# Patient Record
Sex: FEMALE | Race: WHITE | NOT HISPANIC OR LATINO | Employment: PART TIME | URBAN - METROPOLITAN AREA
[De-identification: names, ages, dates, MRNs, and addresses within clinical notes are randomized per-mention and may not be internally consistent; named-entity substitution may affect disease eponyms.]

---

## 2018-08-09 DIAGNOSIS — Q27.0 TWO VESSEL UMBILICAL CORD: Primary | ICD-10-CM

## 2018-08-09 DIAGNOSIS — Z36.89 ENCOUNTER FOR FETAL ANATOMIC SURVEY: ICD-10-CM

## 2018-08-14 ENCOUNTER — OFFICE VISIT (OUTPATIENT)
Dept: MATERNAL FETAL MEDICINE | Facility: CLINIC | Age: 23
End: 2018-08-14
Payer: MEDICAID

## 2018-08-14 ENCOUNTER — INITIAL CONSULT (OUTPATIENT)
Dept: MATERNAL FETAL MEDICINE | Facility: CLINIC | Age: 23
End: 2018-08-14
Payer: MEDICAID

## 2018-08-14 VITALS
SYSTOLIC BLOOD PRESSURE: 122 MMHG | WEIGHT: 173.94 LBS | BODY MASS INDEX: 30.81 KG/M2 | DIASTOLIC BLOOD PRESSURE: 84 MMHG

## 2018-08-14 DIAGNOSIS — O09.899 SINGLE UMBILICAL ARTERY, MATERNAL, ANTEPARTUM: ICD-10-CM

## 2018-08-14 DIAGNOSIS — Q27.0 TWO VESSEL UMBILICAL CORD: ICD-10-CM

## 2018-08-14 DIAGNOSIS — Z36.89 ENCOUNTER FOR ULTRASOUND TO CHECK FETAL GROWTH: Primary | ICD-10-CM

## 2018-08-14 DIAGNOSIS — Z36.89 ENCOUNTER FOR FETAL ANATOMIC SURVEY: ICD-10-CM

## 2018-08-14 PROCEDURE — 76811 OB US DETAILED SNGL FETUS: CPT | Mod: PBBFAC | Performed by: PEDIATRICS

## 2018-08-14 PROCEDURE — 99212 OFFICE O/P EST SF 10 MIN: CPT | Mod: PBBFAC,TH | Performed by: PEDIATRICS

## 2018-08-14 PROCEDURE — 99214 OFFICE O/P EST MOD 30 MIN: CPT | Mod: S$PBB,TH,25, | Performed by: PEDIATRICS

## 2018-08-14 PROCEDURE — 99211 OFF/OP EST MAY X REQ PHY/QHP: CPT | Mod: PBBFAC,25,27

## 2018-08-14 PROCEDURE — 99999 PR PBB SHADOW E&M-EST. PATIENT-LVL I: CPT | Mod: PBBFAC,,,

## 2018-08-14 PROCEDURE — 76811 OB US DETAILED SNGL FETUS: CPT | Mod: 26,S$PBB,, | Performed by: PEDIATRICS

## 2018-08-14 PROCEDURE — 99999 PR PBB SHADOW E&M-EST. PATIENT-LVL II: CPT | Mod: PBBFAC,,, | Performed by: PEDIATRICS

## 2018-08-14 NOTE — LETTER
August 15, 2018      Iris Leyva MD  [address] [facility] [address]           [facility] Maternal [facility]etal [facility]  [address] [facility] [address]  Phone: [phone]          Patient: Shereen Walker   MR Number: [id number]   YOB: 1995   Date of Visit: 8/14/2018       Dear Dr. Iris Leyva:    Thank you for referring Shereen Walker to me for evaluation. Attached you will find relevant portions of my assessment and plan of care.    If you have questions, please do not hesitate to call me. I look forward to following Shereen Walker along with you.    Sincerely,    Yuliya Romo MD    Enclosure  CC:  No Recipients    If you would like to receive this communication electronically, please contact [email or web address] or [phone] to request more information on [employer] Link access.    For providers and/or their staff who would like to refer a patient to [facility], please contact us through our one-stop-shop provider referral line, [facility], at [phone].    If you feel you have received this communication in error or would no longer like to receive these types of communications, please e-mail [email or web address]

## 2018-08-15 PROBLEM — O09.899 SINGLE UMBILICAL ARTERY, MATERNAL, ANTEPARTUM: Status: ACTIVE | Noted: 2018-08-15

## 2018-08-15 NOTE — PROGRESS NOTES
"Indication  ========    Consult: Fetal Anatomical Survey/2VC.    History  ======    General History  Blood group: B  Rhesus: Rh positive  Height 160 cm  Height (ft) 5 ft  Height (in) 3 in  Previous Outcomes   1  Para 0    Pregnancy History  ==============    Maternal Lab Tests  Genetic screening declined.      Maternal Assessment  =================    Weight 79 kg  Weight (lb) 174 lb  Height 160 cm  Height (ft) 5 ft  Height (in) 3 in  BP syst 122 mmHg  BP diast 84 mmHg  BMI 30.85 kg/m²    Method  ======    Transabdominal ultrasound examination. View: Suboptimal view: limited by late gestational age. Suboptimal view: limited by fetal position.    Pregnancy  =========    Sykes pregnancy. Number of fetuses: 1.    Dating  ======    GA by "stated dating" 28 w + 4 d  JOSSELIN by "stated dating": 2018  Ultrasound examination on: 2018  GA by U/S based upon: AC, BPD, Femur, HC  GA by U/S 28 w + 4 d  JOSSELIN by U/S: 2018  Assigned: Dating performed on 2018, based on the external assessment  Assigned GA 28 w + 4 d  Assigned JOSSELIN: 2018    General Evaluation  ==============    Cardiac activity: present.  bpm.  Fetal movements: visualized.  Presentation: cephalic.  Placenta:  Placental site: posterior, fundal.  Umbilical cord: Cord vessels: 2 vessel cord; single left umbilical artery. Cord insertion: placental insertion: normal.  Amniotic fluid: Amount of AF: normal amount. MVP 4.7 cm.    Fetal Biometry  ============    Fetal Biometry  BPD 73.3 mm 29w 3d Hadlock  OFD 91.8 mm 29w 4d Margi  .3 mm 28w 5d Hadlock  .7 mm 28w 0d Hadlock  Femur 53.0 mm 28w 1d Hadlock  EFW 1,192 g 37% Brad  Calculated by: Hadlock (BPD-HC-AC-FL)  EFW (lb) 2 lb  EFW (oz) 10 oz  Cephalic index 0.80  HC / AC 1.12  FL / BPD 0.72  FL / AC 0.22  MVP 4.7 cm   bpm    Fetal Anatomy  ============    Cranium: normal  Lateral ventricles: normal  Choroid plexus: normal  Midline falx: suboptimal  Cavum septi " pellucidi: suboptimal  Cerebellum: suboptimal  Cisterna magna: suboptimal  Neck: suboptimal  Lips: normal  Profile: normal  Nose: normal  4-chamber view: normal  RVOT: normal  LVOT: normal  Heart / Thorax: septum visualized  Situs: normal  Aortic arch: suboptimal  Ductal arch: suboptimal  SVC: suboptimal  IVC: suboptimal  3-vessel view: normal  3-vessel-trachea view: normal  Rt lung: normal  Lt lung: normal  Diaphragm: normal  Cord insertion: normal  Stomach: normal  Bladder: normal  Genitals: normal  Rt kidney: suboptimal  Lt kidney: normal  Liver: normal  Bowel: normal  Rt renal artery: suboptimal  Lt renal artery: normal  Cervical spine: normal  Thoracic spine: normal  Lumbar spine: normal  Sacral spine: normal  Spine / Skelet.: sub opt transverse spine  Rt arm: normal  Lt arm: normal  Rt hand: visualized  Lt hand: visualized  Rt leg: normal  Lt leg: normal  Rt foot: normal  Lt foot: normal  Position of hands: normal  Position of feet: normal  Gender: male  Wants to know gender: yes    Maternal Structures  ===============    Ovaries / Tubes / Adnexa  Rt ovary: Visualized  Lt ovary: Visualized        Consultation  ==========      I appreciate the opportunity to take part in the care of your patient, Ms. Shereen Walker, who is a 23 year old  @ 28-4/7 weeks' gestation. The patient was seen because of a Single Umbilical Artery noted on an outside ultrasound.    Please see the ultrasound report for a full description.    Single umbilical artery occurs in approximately 0.5% to 1.0% of pregnancies. SUA can be associated with other congenital anomalies, genetic syndromes, chromosome abnormalities, or IUGR. We discussed that the finding of an isolated SUA, however, is not associated with an increased risk of chromosomal abnormalities. Invasive prenatal testing is not recommended in this situation. NIPT or screening may be offered if the patient desires (she has already had cffDNA testing).    Congenital heart  defects may be seen with SUA. While this is an uncommon occurrence, fetal echocardiography is an option. We do not routinely offer if our anatomy scan is negative.    Fetuses with a SUA have an approximate 1% incidence of IUGR.    RECOMMENDATIONS:    1. Records of the patient's visits will be sent to the pediatrician of her choice if desired.  2. Repeat US for growth and completion of anatomy in 4 weeks.  3. cffDNA performed.  4. APT only necessary if IUGR or other complication arises.  5. Delivery per obstetrical indicators.              Impression  =========    A detailed fetal anatomic ultrasound examination was performed for the following high risk indication: SUA.    No fetal structural malformations are identified; however, fetal imaging is incomplete today. Specifically, the right kidney and renal artery were  not seen and the intracranial anatomy was largely suboptimal secondary to fetal position. A follow-up study will be scheduled to complete the  fetal anatomic survey. F    Fetal size today is consistent with established gestational age.  Cervical length is normal.  Placental location is normal without evidence of previa.      I spent 15 minutes in direct consultation and care management with greater than 50% in face to face discussion.      Recommendation  ==============    See above.    Thank you again for allowing us to participate in the care of your patients. If you have any questions concerning today's consultation, feel free  to contact me or one of my partners. We can be reached at [phone] during normal business hours. If you have a question after normal  business hours, please contact Labor and Delivery at [phone].

## 2018-08-20 ENCOUNTER — TELEPHONE (OUTPATIENT)
Dept: MATERNAL FETAL MEDICINE | Facility: CLINIC | Age: 23
End: 2018-08-20

## 2018-09-11 ENCOUNTER — INITIAL CONSULT (OUTPATIENT)
Dept: MATERNAL FETAL MEDICINE | Facility: CLINIC | Age: 23
End: 2018-09-11
Payer: MEDICAID

## 2018-09-11 ENCOUNTER — PROCEDURE VISIT (OUTPATIENT)
Dept: MATERNAL FETAL MEDICINE | Facility: CLINIC | Age: 23
End: 2018-09-11
Payer: MEDICAID

## 2018-09-11 VITALS — WEIGHT: 181.19 LBS | DIASTOLIC BLOOD PRESSURE: 74 MMHG | BODY MASS INDEX: 32.1 KG/M2 | SYSTOLIC BLOOD PRESSURE: 112 MMHG

## 2018-09-11 DIAGNOSIS — Z36.89 ENCOUNTER FOR ULTRASOUND TO CHECK FETAL GROWTH: ICD-10-CM

## 2018-09-11 DIAGNOSIS — Z36.89 ENCOUNTER FOR ULTRASOUND TO CHECK FETAL GROWTH: Primary | ICD-10-CM

## 2018-09-11 DIAGNOSIS — O09.899 SINGLE UMBILICAL ARTERY, MATERNAL, ANTEPARTUM: ICD-10-CM

## 2018-09-11 DIAGNOSIS — O35.EXX0 ENCOUNTER FOR REPEAT ULTRASOUND OF FETAL PYELECTASIS IN SINGLETON PREGNANCY, ANTEPARTUM: ICD-10-CM

## 2018-09-11 PROCEDURE — 76816 OB US FOLLOW-UP PER FETUS: CPT | Mod: PBBFAC | Performed by: PEDIATRICS

## 2018-09-11 PROCEDURE — 99213 OFFICE O/P EST LOW 20 MIN: CPT | Mod: S$PBB,TH,25, | Performed by: PEDIATRICS

## 2018-09-11 PROCEDURE — 76816 OB US FOLLOW-UP PER FETUS: CPT | Mod: 26,S$PBB,, | Performed by: PEDIATRICS

## 2018-09-11 NOTE — LETTER
September 11, 2018      Seferino Miller MD  [address] [facility]  [address] [facility] [place] [address]           [facility] - Maternal Fetal Med  [address]  [facility] [address]  Phone: [phone]          Patient: Shereen Walker   MR Number: [id number]   YOB: 1995   Date of Visit: 9/11/2018       Dear Dr. Seferino Miller:    Thank you for referring Shereen Walker to me for evaluation. Attached you will find relevant portions of my assessment and plan of care.    If you have questions, please do not hesitate to call me. I look forward to following Shereen Walker along with you.    Sincerely,    Mary Alice Ko RN    Enclosure  CC:  No Recipients    If you would like to receive this communication electronically, please contact [email or web address] or [phone] to request more information on [employer] Link access.    For providers and/or their staff who would like to refer a patient to [facility], please contact us through our one-stop-shop provider referral line, [facility], at [phone].    If you feel you have received this communication in error or would no longer like to receive these types of communications, please e-mail [email or web address]

## 2018-09-12 PROBLEM — O35.EXX0 ENCOUNTER FOR REPEAT ULTRASOUND OF FETAL PYELECTASIS IN SINGLETON PREGNANCY, ANTEPARTUM: Status: ACTIVE | Noted: 2018-09-12

## 2018-09-12 NOTE — PROGRESS NOTES
"Indication  ========    Evaluation of fetal growth.    History  ======    General History  Blood group: B  Rhesus: Rh positive  Height 160 cm  Height (ft) 5 ft  Height (in) 3 in  Previous Outcomes   1  Para 0    Pregnancy History  ==============    Maternal Lab Tests  Genetic screening declined.      Maternal Assessment  =================    Weight 82 kg  Weight (lb) 181 lb  Height 160 cm  Height (ft) 5 ft  Height (in) 3 in  BP syst 112 mmHg  BP diast 76 mmHg  BMI 32.02 kg/m²    Method  ======    Voluson E10, Transabdominal ultrasound examination. View: Suboptimal view: limited by fetal position.    Pregnancy  =========    Sykes pregnancy. Number of fetuses: 1.    Dating  ======    Cycle: regular cycle  GA by "stated dating" 32 w + 4 d  JOSSELIN by "stated dating": 2018  Ultrasound examination on: 2018  GA by U/S based upon: AC, BPD, Femur, HC  GA by U/S 31 w + 4 d  JOSSELIN by U/S: 2018  Assigned: Dating performed on 2018, based on the external assessment  Assigned GA 32 w + 4 d  Assigned JOSSELIN: 2018    General Evaluation  ==============    Cardiac activity: present.  bpm.  Fetal movements: visualized.  Presentation: cephalic.  Placenta: posterior.  Umbilical cord: 2 vessel cord.    Fetal Biometry  ============    Fetal Biometry  BPD 79.8 mm 32w 0d Hadlock  .0 mm 34w 0d Margi  .5 mm 32w 4d Hadlock  .1 mm 32w 0d Hadlock  Femur 57.0 mm 29w 6d Hadlock  EFW 1,768 g 15% Brad  Calculated by: Hadlock (BPD-HC-AC-FL)  EFW (lb) 3 lb  EFW (oz) 14 oz  Cephalic index 0.77  HC / AC 1.05  FL / BPD 0.71  FL / AC 0.20   bpm  Head / Face / Neck   4.5 mm    Fetal Anatomy  ============    Cranium: normal  Midline falx: suboptimal  Cavum septi pellucidi: normal  Cerebellum: suboptimal  Cisterna magna: suboptimal  Lt lateral ventricle: normal  4-chamber view: normal  Aortic arch: suboptimal  Ductal " arch: suboptimal  SVC: normal  IVC: normal  Stomach: normal  Bladder: normal  Rt kidney: normal  Lt kidney: 8.7mm  Rt renal artery: visualized  Gender: male  Wants to know gender: yes  Other: A full anatomy survey previously performed.            Consultation  ==========    I appreciate the opportunity to assist you with the care of your patient, Ms. Walker. The patient is a 23 year old  at 32-4/7 weeks' gestation.    In addition to a SUA for which the patient is being followed, her fetus is noted to have right renal pyelectasis and emerging calyceal involvement  (will reexamine) and possible enlarged right ureter.    Pyelectasis is defined as a fetal renal pelvis with an AP diameter of 5 mm or more. An AP diameter of >10 mm at any gestational age or with  calyceal involvement is considered hydronephrosis. Early diagnosis of pyelectasis is associated with a 90% chance of complete resolution by  term. In only 10 % will pyelectasis progress to hydronephrosis. The worsening typically occurs in the third trimester. The presence of  hydronephrosis should be re-evaluated every 3 - 4 weeks. If decreased amniotic fluid volume is present, the pregnancy should be followed with  weekly BPP's to document fetal wellbeing. Fetuses with hydronephrosis near term should be evaluated by the Pediatrician after delivery and a   renal ultrasound considered.    As long as the amniotic fluid volume remains normal and the pyelectasis does not progress to hydronephrosis, further renal evaluation can  await delivery.    Normative values for fetal renal size based upon gestational age (GA) have been established but there is a lack of consensus on the renal AP  measurement defining that clinically significant hydronephrosis necessitating  follow-up with a significant possibility for renal  pathology:    1. Renal pelvic diameter (RPD) of 4 - 5 mm is used by most MFM as the cutoff for fetal pyelectasis in the second  trimester.    2. Mild renal pelvic dilation is defined is considered 4 (or 5) to 10 mm in the second trimester. Most will resolve but a few may be persistent and  require  intervention.    3. RPD >10 mm in the second trimester may be associated with increased risks of congenital anomalies of the kidney and urinary tract  (CAKUT) with the greatest risk group being those with RPD >15mm.        I discussed the findings with the patient and demonstrated the anatomy/pathologic changes. We reviewed the usual course and possible   interventions.      Impression  =========    Biometry is consistent with dating.    Limited anatomy reveals right kidney and right renal artery. Anatomy remains incomplete. Left kidney shows significant pyelectasis (8 mm) and  caliectasis and apparent dilation of right ureter.    MVP is normal.      I spent 15 minutes in direct consultation and care management with greater than 50% in face to face discussion.      Recommendation  ==============    1. Return in 4 weeks for growth and re-evaluation of the left kidney.    Thank you again for allowing us to participate in the care of your patients. If you have any questions concerning today's consultation, feel free  to contact me or one of my partners. We can be reached at [phone] during normal business hours. If you have a question after normal  business hours, please contact Labor and Delivery at [phone].

## 2018-10-11 ENCOUNTER — PROCEDURE VISIT (OUTPATIENT)
Dept: MATERNAL FETAL MEDICINE | Facility: CLINIC | Age: 23
End: 2018-10-11
Payer: MEDICAID

## 2018-10-11 ENCOUNTER — INITIAL CONSULT (OUTPATIENT)
Dept: MATERNAL FETAL MEDICINE | Facility: CLINIC | Age: 23
End: 2018-10-11
Payer: MEDICAID

## 2018-10-11 VITALS
SYSTOLIC BLOOD PRESSURE: 132 MMHG | WEIGHT: 187.81 LBS | BODY MASS INDEX: 33.27 KG/M2 | DIASTOLIC BLOOD PRESSURE: 94 MMHG

## 2018-10-11 DIAGNOSIS — O35.EXX0 ENCOUNTER FOR REPEAT ULTRASOUND OF FETAL PYELECTASIS IN SINGLETON PREGNANCY, ANTEPARTUM: Primary | ICD-10-CM

## 2018-10-11 DIAGNOSIS — Z36.89 ENCOUNTER FOR ULTRASOUND TO CHECK FETAL GROWTH: ICD-10-CM

## 2018-10-11 PROCEDURE — 76816 OB US FOLLOW-UP PER FETUS: CPT | Mod: 26,S$PBB,, | Performed by: PEDIATRICS

## 2018-10-11 PROCEDURE — 76820 UMBILICAL ARTERY ECHO: CPT | Mod: 26,S$PBB,, | Performed by: PEDIATRICS

## 2018-10-11 PROCEDURE — 99214 OFFICE O/P EST MOD 30 MIN: CPT | Mod: S$PBB,TH,25, | Performed by: PEDIATRICS

## 2018-10-11 PROCEDURE — 76819 FETAL BIOPHYS PROFIL W/O NST: CPT | Mod: 26,S$PBB,, | Performed by: PEDIATRICS

## 2018-10-11 PROCEDURE — 76820 UMBILICAL ARTERY ECHO: CPT | Mod: PBBFAC | Performed by: PEDIATRICS

## 2018-10-11 PROCEDURE — 99999 PR PBB SHADOW E&M-EST. PATIENT-LVL III: CPT | Mod: PBBFAC,,, | Performed by: PEDIATRICS

## 2018-10-11 PROCEDURE — 99213 OFFICE O/P EST LOW 20 MIN: CPT | Mod: PBBFAC,TH,25 | Performed by: PEDIATRICS

## 2018-10-11 PROCEDURE — 76816 OB US FOLLOW-UP PER FETUS: CPT | Mod: PBBFAC | Performed by: PEDIATRICS

## 2018-10-11 PROCEDURE — 76819 FETAL BIOPHYS PROFIL W/O NST: CPT | Mod: PBBFAC | Performed by: PEDIATRICS

## 2018-10-11 NOTE — LETTER
October 16, 2018      Seferino Miller MD  [address] [facility]  [address] [address] [place] [facility] [place] [address]           [facility] - Maternal Fetal Med  [address]  [facility] [address]  Phone: [phone]          Patient: Shereen Walker   MR Number: [id number]   YOB: 1995   Date of Visit: 10/11/2018       Dear Dr. Seferino Miller:    Thank you for referring Shereen Walker to me for evaluation. Attached you will find relevant portions of my assessment and plan of care.    If you have questions, please do not hesitate to call me. I look forward to following Shereen Walker along with you.    Sincerely,    Yuliya Romo MD    Enclosure  CC:  No Recipients    If you would like to receive this communication electronically, please contact [email or web address] or [phone] to request more information on [employer] Link access.    For providers and/or their staff who would like to refer a patient to [facility], please contact us through our one-stop-shop provider referral line, [facility], at [phone].    If you feel you have received this communication in error or would no longer like to receive these types of communications, please e-mail [email or web address]

## 2018-10-12 ENCOUNTER — OFFICE VISIT (OUTPATIENT)
Dept: PEDIATRIC CARDIOLOGY | Facility: CLINIC | Age: 23
End: 2018-10-12
Payer: MEDICAID

## 2018-10-12 ENCOUNTER — CLINICAL SUPPORT (OUTPATIENT)
Dept: PEDIATRIC CARDIOLOGY | Facility: CLINIC | Age: 23
End: 2018-10-12
Payer: MEDICAID

## 2018-10-12 ENCOUNTER — OFFICE VISIT (OUTPATIENT)
Dept: MATERNAL FETAL MEDICINE | Facility: CLINIC | Age: 23
End: 2018-10-12
Payer: MEDICAID

## 2018-10-12 VITALS
WEIGHT: 183.63 LBS | DIASTOLIC BLOOD PRESSURE: 90 MMHG | SYSTOLIC BLOOD PRESSURE: 130 MMHG | BODY MASS INDEX: 32.54 KG/M2 | HEIGHT: 63 IN | HEART RATE: 90 BPM

## 2018-10-12 DIAGNOSIS — O35.BXX0 PREGNANCY COMPLICATED BY FETAL CONGENITAL HEART DISEASE, SINGLE OR UNSPECIFIED FETUS: Primary | ICD-10-CM

## 2018-10-12 DIAGNOSIS — O35.9XX0 SUSPECTED FETAL ABNORMALITY AFFECTING MANAGEMENT OF MOTHER, ANTEPARTUM, SINGLE OR UNSPECIFIED FETUS: Primary | ICD-10-CM

## 2018-10-12 DIAGNOSIS — O35.9XX0 SUSPECTED FETAL ABNORMALITY AFFECTING MANAGEMENT OF MOTHER, ANTEPARTUM, SINGLE OR UNSPECIFIED FETUS: ICD-10-CM

## 2018-10-12 DIAGNOSIS — O35.BXX1 ANOMALY OF HEART OF FETUS AFFECTING PREGNANCY, ANTEPARTUM, FETUS 1 OF MULTIPLE GESTATION: ICD-10-CM

## 2018-10-12 PROCEDURE — 76827 ECHO EXAM OF FETAL HEART: CPT | Mod: PBBFAC | Performed by: PEDIATRICS

## 2018-10-12 PROCEDURE — 76827 ECHO EXAM OF FETAL HEART: CPT | Mod: 26,S$PBB,, | Performed by: PEDIATRICS

## 2018-10-12 PROCEDURE — 93325 DOPPLER ECHO COLOR FLOW MAPG: CPT | Mod: PBBFAC | Performed by: PEDIATRICS

## 2018-10-12 PROCEDURE — 99213 OFFICE O/P EST LOW 20 MIN: CPT | Mod: PBBFAC,25 | Performed by: PEDIATRICS

## 2018-10-12 PROCEDURE — 99499 UNLISTED E&M SERVICE: CPT | Mod: S$PBB,,, | Performed by: PEDIATRICS

## 2018-10-12 PROCEDURE — 93325 DOPPLER ECHO COLOR FLOW MAPG: CPT | Mod: 26,S$PBB,, | Performed by: PEDIATRICS

## 2018-10-12 PROCEDURE — 76825 ECHO EXAM OF FETAL HEART: CPT | Mod: PBBFAC | Performed by: PEDIATRICS

## 2018-10-12 PROCEDURE — 99999 PR PBB SHADOW E&M-EST. PATIENT-LVL III: CPT | Mod: PBBFAC,,, | Performed by: PEDIATRICS

## 2018-10-12 PROCEDURE — 76825 ECHO EXAM OF FETAL HEART: CPT | Mod: 26,S$PBB,, | Performed by: PEDIATRICS

## 2018-10-12 PROCEDURE — 99215 OFFICE O/P EST HI 40 MIN: CPT | Mod: 25,S$PBB,, | Performed by: PEDIATRICS

## 2018-10-15 NOTE — PROGRESS NOTES
[facility] Pediatric Cardiology Fetal Cardiology Clinic    Today, I had the pleasure of evaluating Shereen Walker who is now 23 y.o. and carrying her first pregnancy at 37 0/7 weeks gestation with an JOSSELIN of 18. She was referred for evaluation of the fetal heart due a concern for congenital heart disease on prenatal obstetrical ultrasound as well as a single umbilical artery.  Specifically, Dr. Romo was concerned due to her inability to see the fetal aortic arch well.      She is carrying a male  fetus, named Johanna.      Obstetric History:    .  Her OB history is otherwise unremarkable.  Her OB care is by Dr. Seferino Miller and her [facility] care is by Dr. Romo.    History reviewed. No pertinent past medical history.      Current Outpatient Medications:     NORETHINDRONE-E.ESTRADIOL-IRON (MINASTRIN 24 FE ORAL), Take by mouth., Disp: , Rfl:     valacyclovir (VALTREX) 500 MG tablet, Take 1 tablet (500 mg total) by mouth 2 (two) times daily., Disp: 10 tablet, Rfl: 0    Family History: Negative for congenital heart disease, early coronary artery disease, sudden unexplained death, connective tissues disorders, genetic syndromes, multiple miscarriages or other congenital anomalies.  Ms. Walker's brother has diabetes insipidus.    Social History: Ms. Shereen Walker is involved with the father of the baby.  He is an .  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    FETAL ECHOCARDIOGRAM (summary):  Fetal echocardiogram at 37 0/7 weeks gestation for a concern for congenital  heart disease on prenatal obstetrical ultrasound and a single umbilical artery. JOSSELIN  18.  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 Alternatively, the anatomy could be a right aortic arch with a left ductus.  Otherwise, normally connected heart.  No ectopy or sustained arrhythmia demonstrated throughout the study.  Normal fetal atrial and ductal level  shunting.  No ventricular level shunting.  Normal atrioventricular and semilunar valve structure and function.  Normal biventricular size and systolic function.  No pericardial effusion.  (Full report in electronic medical record)    Impression:  Single active male fetus at 37 wga.  The echocardiogram shows that the internal cardiac anatomy is normal.  I am concerned about a possible double aortic arch or a right aortic arch with a left ductus arteriosus.  I told Ms. Walker and her parents that this is a difficult diagnosis to make by ultrasound, so we will have to wait on the  imaging studies (likely including a CTA) for a definitive diagnosis.  The aortic arch appears to be of good size but is difficult to image.  I think whatever the vascular anatomy, this baby will likely have a vascular ring.  Given these findings as well as the family history of diabetes insipidus, I would be more comfortable with Johanna being born at [facility] so that we could have cardiology assess him soon after birth.  If the arch appears unobstructed and he has no symptoms from a ring, we could likely discharge him home with follow up in the clinic and an eventual vascular ring division.  If there are any clinical problems in the nursery, we could address them at that time.    I discussed with her that fetal echocardiography is insufficiently sensitive to rule out all septal defects, anomalies of pulmonary and systemic veins, arch anomalies, and some valvar abnormalities, nor can it ensure that the ductus arteriosus and foramen ovale will spontaneously close.     Recommendations:    1. 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 Alternatively, the anatomy could be a right aortic arch with a left ductus. Otherwise, normally connected heart.  2. Any prenatal genetic diagnoses or other major associated abnormalities, conditions: single umbilical  artery, right hydronephrosis  3. Primary Fetal Cardiologist: Joy  Prenatal Recommendations:   1. Prenatal Cath MD Consult: No   2. Prenatal Congenital Heart Surgery Consult ? No   3. Follow up fetal cardiac evaluation is not needed  Delivery and post  recommendations as of last fetal visit:  1. Recommend delivery at tertiary care center such as [facility] where Pediatric Cardiology and Congenital Heart Surgery care are immediately available  2. From fetal cardiac perspective, ok for vaginal delivery  3. Recommend delivery ad larissa  4. PGE is likely not necessary  5. Likely need for urgent intervention within first hours after delivery: No   6. Cardiology Consultant recommended to attend delivery: No   7. Transition to nursery  8. Cardiology consult: Yes  9.  surgical or cath intervention: likely not    The above information was discussed in detail including the use of diagrams, with 60 minutes of total face to face time, with greater than 50% with counseling and coordination of care.  The discussion of the diagnosis and treatment options is as described above.      J Carlos Hamilton MD, MPH  Pediatric and Fetal Cardiology  [facility]   [address] [place] [address]    Office: [phone]  Cell: [phone]

## 2018-10-16 PROBLEM — O35.BXX0 FETAL CARDIAC ANOMALY AFFECTING PREGNANCY, ANTEPARTUM: Status: ACTIVE | Noted: 2018-10-16

## 2018-10-16 NOTE — PROGRESS NOTES
"Indication  ========    Evaluation of fetal growth: 2VC.    History  ======    General History  Blood group: B  Rhesus: Rh positive  Height 160 cm  Height (ft) 5 ft  Height (in) 3 in  Previous Outcomes   1  Para 0    Pregnancy History  ==============    Maternal Lab Tests  Genetic screening declined.      Maternal Assessment  =================    Height 160 cm  Height (ft) 5 ft  Height (in) 3 in    Method  ======    Voluson E10, Transabdominal ultrasound examination. View: Good view.    Pregnancy  =========    Sykes pregnancy. Number of fetuses: 1.    Dating  ======    Cycle: regular cycle  GA by "stated dating" 36 w + 6 d  JOSSELIN by "stated dating": 2018  Ultrasound examination on: 10/11/2018  GA by U/S based upon: AC, BPD, Femur, HC  GA by U/S 34 w + 6 d  JOSSELIN by U/S: 2018  Assigned: Dating performed on 2018, based on the external assessment  Assigned GA 36 w + 6 d  Assigned JOSSELIN: 2018    General Evaluation  ==============    Cardiac activity: present.  bpm.  Fetal movements: visualized.  Presentation: cephalic.  Placenta: posterior.  Umbilical cord: 2 vessel cord.  Amniotic fluid: normal amountMVP 4.9 cm.    Biophysical Profile  ==============    2: Fetal breathing movements  2: Gross body movements  2: Fetal tone  2: Amniotic fluid volume  8: Biophysical profile score  Interpretation: normal    Fetal Biometry  ============    Fetal Biometry  BPD 87.0 mm 35w 1d Hadlock  .2 mm  .6 mm 36w 2d Hadlock  .5 mm 34w 4d Hadlock  Femur 64.8 mm 33w 3d Hadlock  EFW 2,434 g 7% Brad  Calculated by: Hadlock (BPD-HC-AC-FL)  EFW (lb) 5 lb  EFW (oz) 6 oz  Cephalic index 0.76  HC / AC 1.05  FL / BPD 0.74  FL / AC 0.21  MVP 4.9 cm   bpm    Fetal Anatomy  ============    Cranium: normal  Aortic arch: not visualized  Ductal arch: not visualized  SVC: suboptimal  IVC: suboptimal  Stomach: normal  Bladder: normal  Rt kidney: normal  Lt kidney: abnormal  Lt " kidney: hydronephrosis  Lt ureter: abnormal  Gender: male  Wants to know gender: yes  Other: A full anatomy survey previously performed.    Fetal Doppler  ===========    Umbilical Cord  Umbilical artery: normal  Umbilical A EDF: normal  Umbilical A RI 0.57 54% Nury  Umbilical A S / D 2.30 46% Nury  Umbilical A  bpm            Consultation  ==========    On US today, left hydronephrosis is noted (enlarged from 8 to 13 mm) with left ureteral dilation and enlarged bladder. The aortic arch could not  be visualized completely in the appropriate plane with clearly abnormal angulation.    The EFWt is at the 7th percentile.    Addendum: Patient was sent for fetal echo. It was performed on 10/15 with a read today. 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 Alternatively, the anatomy could be a right aortic arch with a left ductus.  Otherwise, normally connected heart.    No ectopy or sustained arrhythmia demonstrated throughout the study.  Normal fetal atrial and ductal level shunting.  No ventricular level shunting.      Ms. Walker had a screen negative cffDNA. The current findings - cardiac defect (double aorta), hydronephrosis, SUA and IUGR may indicate an  underlying genetic issue such as 22q11-.      Impression  =========    Biometry is consistent with dating.    Limited anatomy reveals right kidney and right renal artery. Anatomy remains incomplete. Left kidney shows significant hydronephrosis (13  mm) and apparent dilation of right ureter.    MVP is normal.  Fetal UA Doppler S/D ratio could not be calculated (fetal movement/breathing) but diastolic flow is excellent.  BPP is 8 of 8.      I spent 25 minutes in direct consultation and care management with greater than 50% in face to face discussion.      Recommendation  ==============    1. Twice weekly AP testing.  2. Suggest that delivery should take place  at [facility].  3. Have left message with Dr. Miller's office.  4. Should deliver prior to 41 weeks'.  5. Testing for 22q11-/microarray after delivery.  6. Pediatricians should be aware of possibility of hypocalcemia if this is true.      Thank you again for allowing us to participate in the care of your patients. If you have any questions concerning today's consultation, feel free  to contact me or one of my partners. We can be reached at [phone] during normal business hours. If you have a question after normal  business hours, please contact Labor and Delivery at [phone].

## 2018-10-16 NOTE — PROGRESS NOTES
Ms. Walker was seen in follow up following fetal echo with pediatric cardiologist.  No visit or charge.

## 2018-10-18 ENCOUNTER — SOCIAL WORK (OUTPATIENT)
Dept: CASE MANAGEMENT | Facility: OTHER | Age: 23
End: 2018-10-18

## 2018-10-18 ENCOUNTER — PROCEDURE VISIT (OUTPATIENT)
Dept: MATERNAL FETAL MEDICINE | Facility: CLINIC | Age: 23
End: 2018-10-18
Payer: MEDICAID

## 2018-10-18 ENCOUNTER — INITIAL CONSULT (OUTPATIENT)
Dept: MATERNAL FETAL MEDICINE | Facility: CLINIC | Age: 23
End: 2018-10-18
Payer: MEDICAID

## 2018-10-18 VITALS — SYSTOLIC BLOOD PRESSURE: 124 MMHG | WEIGHT: 190.5 LBS | DIASTOLIC BLOOD PRESSURE: 74 MMHG | BODY MASS INDEX: 33.75 KG/M2

## 2018-10-18 DIAGNOSIS — O35.BXX0 ANOMALY OF HEART OF FETUS AFFECTING PREGNANCY, ANTEPARTUM, SINGLE OR UNSPECIFIED FETUS: Primary | ICD-10-CM

## 2018-10-18 DIAGNOSIS — Z36.9 ENCOUNTER FOR FETAL ULTRASOUND: Primary | ICD-10-CM

## 2018-10-18 PROCEDURE — 76815 OB US LIMITED FETUS(S): CPT | Mod: 26,S$PBB,, | Performed by: PEDIATRICS

## 2018-10-18 PROCEDURE — 76815 OB US LIMITED FETUS(S): CPT | Mod: PBBFAC | Performed by: PEDIATRICS

## 2018-10-18 PROCEDURE — 76819 FETAL BIOPHYS PROFIL W/O NST: CPT | Mod: 26,S$PBB,, | Performed by: PEDIATRICS

## 2018-10-18 PROCEDURE — 99212 OFFICE O/P EST SF 10 MIN: CPT | Mod: PBBFAC,TH,25 | Performed by: PEDIATRICS

## 2018-10-18 PROCEDURE — 99214 OFFICE O/P EST MOD 30 MIN: CPT | Mod: 25,S$PBB,TH, | Performed by: PEDIATRICS

## 2018-10-18 PROCEDURE — 99999 PR PBB SHADOW E&M-EST. PATIENT-LVL II: CPT | Mod: PBBFAC,,, | Performed by: PEDIATRICS

## 2018-10-18 PROCEDURE — 76819 FETAL BIOPHYS PROFIL W/O NST: CPT | Mod: PBBFAC | Performed by: PEDIATRICS

## 2018-10-18 RX ORDER — VALACYCLOVIR HYDROCHLORIDE 500 MG/1
500 TABLET, FILM COATED ORAL DAILY
Refills: 0 | Status: ON HOLD | COMMUNITY
Start: 2018-10-04 | End: 2021-10-24 | Stop reason: HOSPADM

## 2018-10-18 NOTE — NURSING
Patient's induction rescheduled to Tuesday 10/23/18 at 8:30 pm per Dr. Romo request.     Induction instructions given to patient and patient verbalized understanding of information received.    JONH Small notified and aware that patient's ultrasound and consul were completed and notified of patient's request for a NICU tour.  verbalized understanding and patient escorted to NICU waiting room.    Per patient, Dr. Romo states there is a chance infant may not need to go to NICU or PICU and can be followed in the Nursery, but that they are unsure. Patient wanting a NICU tour in case.    Nurse phoned  back to update her what was discussed with patient.  verbalized understanding of information received.

## 2018-10-18 NOTE — LETTER
October 19, 2018      Seferino Miller MD  [address] [facility]  [place] [facility] [place] [address]           [facility]  [address]  [facility] [address]  Phone: [phone]          Patient: Shereen Walker   MR Number: [id number]   YOB: 1995   Date of Visit: 10/18/2018       Dear Dr. Seferino Miller:    Thank you for referring Shereen Walker to me for evaluation. Attached you will find relevant portions of my assessment and plan of care.    If you have questions, please do not hesitate to call me. I look forward to following Shereen Walker along with you.    Sincerely,    Yuliya Romo MD    Enclosure  CC:  No Recipients    If you would like to receive this communication electronically, please contact [email or web address] or [phone] to request more information on [facility] [employer] access.    For providers and/or their staff who would like to refer a patient to [facility], please contact us through our one-stop-shop provider referral line, [facility] [place], at [phone].    If you feel you have received this communication in error or would no longer like to receive these types of communications, please e-mail [email or web address]

## 2018-10-18 NOTE — PROGRESS NOTES
NICU tour provided to pt, FOB and mgm. NICU handbook provided. All questions regarding a possible NICU admission were answered. Emotional support also provided.      Teresa Phillips LCSW    [facility]  [email or web address]    (phone) [phone] or  Ext. [phone]  (fax) [phone]

## 2018-10-19 NOTE — PROGRESS NOTES
"Indication  ========    BPP; fetal heart defect.    History  ======    General History  Blood group: B  Rhesus: Rh positive  Height 160 cm  Height (ft) 5 ft  Height (in) 3 in  Previous Outcomes   1  Para 0    Pregnancy History  ==============    Maternal Lab Tests  Genetic screening declined.      Maternal Assessment  =================    Weight 86 kg  Weight (lb) 190 lb  Height 160 cm  Height (ft) 5 ft  Height (in) 3 in  BP syst 124 mmHg  BP diast 74 mmHg  BMI 33.59 kg/m²    Method  ======    Transabdominal ultrasound examination, Voluson E10. View: Good view.    Pregnancy  =========    Sykes pregnancy. Number of fetuses: 1.    Dating  ======    Cycle: regular cycle  GA by "stated dating" 37 w + 6 d  JOSSELIN by "stated dating": 2018  Assigned: Dating performed on 2018, based on the external assessment  Assigned GA 37 w + 6 d  Assigned JOSSELIN: 2018    General Evaluation  ==============    Cardiac activity: present.  bpm.  Fetal movements: visualized.  Presentation: cephalic.  Placenta: posterior.  Umbilical cord: 2 vessel cord.  Amniotic fluid: normal amountMVP 5.5 cm.    Biophysical Profile  ==============    2: Fetal breathing movements  2: Gross body movements  2: Fetal tone  2: Amniotic fluid volume  /8: Biophysical profile score  Interpretation: normal    Fetal Biometry  ============    Fetal Biometry  Calculated by: Hadlock (BPD-HC-AC-FL)  MVP 5.5 cm   bpm    Fetal Anatomy  ============    Cranium: normal  4-chamber view: documented previously  Stomach: normal  Bladder: normal  Genitals: normal  Rt kidney: normal  Lt kidney: hydronephrosis  Gender: male  Wants to know gender: yes            Consultation  ==========    Ms. Walker returns for discussion regarding timing of delivery. Dr. Miller and I discussed options; given the uncertainty of the fetal cardiac  status, we agree that delivery at [facility] [place] may be optimal even though patient is unlikely to require immediate " intervention.    Fetus is 37-6/7 weeks' today. BPP today is 8 of 8. We discussed timing of delivery and agree that it should be between 38 and 39 weeks'. She  was examined in [place] and is apparently 4 cm dilated, so IOL should not be a problem. To optimize presence of other services, she will be  admitted on the evening of the 23rd with induction on the following day.      1. Twice weekly AP testing.  2. Suggest that delivery should take place at [facility] [place], operated by [facility].  3. Delivery at 38 - 39 weeks.  4. Testing for 22q11-/microarray/full karyotype after delivery.  6. Pediatricians should be aware of possibility of hypocalcemia if this is true.                Impression  =========    BPP is 8 of 8.  MVP is normal.      I spent 25 minutes in direct consultation and care management with greater than 50% in face to face discussion.      Recommendation  ==============    Thank you again for allowing us to participate in the care of your patients. If you have any questions concerning today's consultation, feel free  to contact me or one of my partners. We can be reached at [phone] during normal business hours. If you have a question after normal  business hours, please contact Labor and Delivery at [phone].

## 2018-10-23 ENCOUNTER — HOSPITAL ENCOUNTER (INPATIENT)
Facility: OTHER | Age: 23
LOS: 3 days | Discharge: HOME | End: 2018-10-26
Attending: PEDIATRICS | Admitting: PEDIATRICS
Payer: MEDICAID

## 2018-10-23 ENCOUNTER — ANESTHESIA (OUTPATIENT)
Dept: OBSTETRICS AND GYNECOLOGY | Facility: OTHER | Age: 23
End: 2018-10-23
Payer: MEDICAID

## 2018-10-23 ENCOUNTER — ANESTHESIA EVENT (OUTPATIENT)
Dept: OBSTETRICS AND GYNECOLOGY | Facility: OTHER | Age: 23
End: 2018-10-23
Payer: MEDICAID

## 2018-10-23 DIAGNOSIS — O35.BXX0 FETAL CARDIAC ANOMALY AFFECTING PREGNANCY, ANTEPARTUM: ICD-10-CM

## 2018-10-23 LAB
ABO + RH BLD: NORMAL
BASOPHILS # BLD AUTO: 0.01 K/UL
BASOPHILS NFR BLD: 0.1 %
BLD GP AB SCN CELLS X3 SERPL QL: NORMAL
DIFFERENTIAL METHOD: ABNORMAL
EOSINOPHIL # BLD AUTO: 0.1 K/UL
EOSINOPHIL NFR BLD: 1 %
ERYTHROCYTE [DISTWIDTH] IN BLOOD BY AUTOMATED COUNT: 12.6 %
HCT VFR BLD AUTO: 36.3 %
HGB BLD-MCNC: 12.4 G/DL
LYMPHOCYTES # BLD AUTO: 2.4 K/UL
LYMPHOCYTES NFR BLD: 23.6 %
MCH RBC QN AUTO: 29.5 PG
MCHC RBC AUTO-ENTMCNC: 34.2 G/DL
MCV RBC AUTO: 86 FL
MONOCYTES # BLD AUTO: 0.6 K/UL
MONOCYTES NFR BLD: 5.8 %
NEUTROPHILS # BLD AUTO: 7 K/UL
NEUTROPHILS NFR BLD: 69.3 %
PLATELET # BLD AUTO: 243 K/UL
PMV BLD AUTO: 10.9 FL
RBC # BLD AUTO: 4.2 M/UL
WBC # BLD AUTO: 10.09 K/UL

## 2018-10-23 PROCEDURE — 25000003 PHARM REV CODE 250: Performed by: STUDENT IN AN ORGANIZED HEALTH CARE EDUCATION/TRAINING PROGRAM

## 2018-10-23 PROCEDURE — 36415 COLL VENOUS BLD VENIPUNCTURE: CPT

## 2018-10-23 PROCEDURE — 86592 SYPHILIS TEST NON-TREP QUAL: CPT

## 2018-10-23 PROCEDURE — 86703 HIV-1/HIV-2 1 RESULT ANTBDY: CPT

## 2018-10-23 PROCEDURE — 72100002 HC LABOR CARE, 1ST 8 HOURS

## 2018-10-23 PROCEDURE — 86850 RBC ANTIBODY SCREEN: CPT

## 2018-10-23 PROCEDURE — 11000001 HC ACUTE MED/SURG PRIVATE ROOM

## 2018-10-23 PROCEDURE — 85025 COMPLETE CBC W/AUTO DIFF WBC: CPT

## 2018-10-23 RX ORDER — OXYTOCIN/RINGER'S LACTATE 20/1000 ML
41.7 PLASTIC BAG, INJECTION (ML) INTRAVENOUS CONTINUOUS
Status: ACTIVE | OUTPATIENT
Start: 2018-10-23 | End: 2018-10-24

## 2018-10-23 RX ORDER — SODIUM CHLORIDE, SODIUM LACTATE, POTASSIUM CHLORIDE, CALCIUM CHLORIDE 600; 310; 30; 20 MG/100ML; MG/100ML; MG/100ML; MG/100ML
INJECTION, SOLUTION INTRAVENOUS CONTINUOUS
Status: DISCONTINUED | OUTPATIENT
Start: 2018-10-23 | End: 2018-10-24

## 2018-10-23 RX ORDER — OXYTOCIN/RINGER'S LACTATE 20/1000 ML
10 PLASTIC BAG, INJECTION (ML) INTRAVENOUS ONCE
Status: DISCONTINUED | OUTPATIENT
Start: 2018-10-23 | End: 2018-10-24

## 2018-10-23 RX ORDER — ONDANSETRON 8 MG/1
8 TABLET, ORALLY DISINTEGRATING ORAL EVERY 8 HOURS PRN
Status: DISCONTINUED | OUTPATIENT
Start: 2018-10-23 | End: 2018-10-24

## 2018-10-23 RX ORDER — ACETAMINOPHEN 325 MG/1
650 TABLET ORAL EVERY 6 HOURS PRN
Status: DISCONTINUED | OUTPATIENT
Start: 2018-10-23 | End: 2018-10-24

## 2018-10-23 RX ORDER — SODIUM CHLORIDE 9 MG/ML
INJECTION, SOLUTION INTRAVENOUS
Status: DISCONTINUED | OUTPATIENT
Start: 2018-10-23 | End: 2018-10-24

## 2018-10-23 RX ORDER — OXYTOCIN/RINGER'S LACTATE 20/1000 ML
333 PLASTIC BAG, INJECTION (ML) INTRAVENOUS CONTINUOUS
Status: ACTIVE | OUTPATIENT
Start: 2018-10-23 | End: 2018-10-23

## 2018-10-23 RX ORDER — CLINDAMYCIN PHOSPHATE 900 MG/50ML
900 INJECTION, SOLUTION INTRAVENOUS ONCE
Status: DISCONTINUED | OUTPATIENT
Start: 2018-10-23 | End: 2018-10-24

## 2018-10-23 RX ORDER — OXYTOCIN/RINGER'S LACTATE 20/1000 ML
2 PLASTIC BAG, INJECTION (ML) INTRAVENOUS CONTINUOUS
Status: DISCONTINUED | OUTPATIENT
Start: 2018-10-23 | End: 2018-10-24

## 2018-10-23 RX ADMIN — SODIUM CHLORIDE, SODIUM LACTATE, POTASSIUM CHLORIDE, AND CALCIUM CHLORIDE: .6; .31; .03; .02 INJECTION, SOLUTION INTRAVENOUS at 09:10

## 2018-10-24 PROBLEM — O35.BXX0 FETAL CARDIAC ANOMALY AFFECTING PREGNANCY, ANTEPARTUM: Status: RESOLVED | Noted: 2018-10-16 | Resolved: 2018-10-24

## 2018-10-24 PROBLEM — O35.BXX0 FETAL CARDIAC ANOMALY AFFECTING PREGNANCY, ANTEPARTUM: Status: ACTIVE | Noted: 2018-10-24

## 2018-10-24 LAB
ANION GAP SERPL CALC-SCNC: 9 MMOL/L
BUN SERPL-MCNC: 7 MG/DL
CALCIUM SERPL-MCNC: 9.1 MG/DL
CHLORIDE SERPL-SCNC: 109 MMOL/L
CO2 SERPL-SCNC: 19 MMOL/L
CREAT SERPL-MCNC: 0.6 MG/DL
EST. GFR  (AFRICAN AMERICAN): >60 ML/MIN/1.73 M^2
EST. GFR  (NON AFRICAN AMERICAN): >60 ML/MIN/1.73 M^2
GLUCOSE SERPL-MCNC: 78 MG/DL
HIV 1+2 AB+HIV1 P24 AG SERPL QL IA: NEGATIVE
POTASSIUM SERPL-SCNC: 4.2 MMOL/L
RPR SER QL: NORMAL
SODIUM SERPL-SCNC: 137 MMOL/L

## 2018-10-24 PROCEDURE — 51702 INSERT TEMP BLADDER CATH: CPT

## 2018-10-24 PROCEDURE — 3E033VJ INTRODUCTION OF OTHER HORMONE INTO PERIPHERAL VEIN, PERCUTANEOUS APPROACH: ICD-10-PCS | Performed by: OBSTETRICS & GYNECOLOGY

## 2018-10-24 PROCEDURE — 72200005 HC VAGINAL DELIVERY LEVEL II

## 2018-10-24 PROCEDURE — 25000003 PHARM REV CODE 250: Performed by: ANESTHESIOLOGY

## 2018-10-24 PROCEDURE — 88307 TISSUE EXAM BY PATHOLOGIST: CPT | Mod: 26,,, | Performed by: PATHOLOGY

## 2018-10-24 PROCEDURE — 63600175 PHARM REV CODE 636 W HCPCS: Performed by: STUDENT IN AN ORGANIZED HEALTH CARE EDUCATION/TRAINING PROGRAM

## 2018-10-24 PROCEDURE — 36415 COLL VENOUS BLD VENIPUNCTURE: CPT

## 2018-10-24 PROCEDURE — 88307 TISSUE EXAM BY PATHOLOGIST: CPT | Performed by: PATHOLOGY

## 2018-10-24 PROCEDURE — 27800517 HC TRAY,EPIDURAL-CONTINUOUS: Performed by: ANESTHESIOLOGY

## 2018-10-24 PROCEDURE — 25000003 PHARM REV CODE 250: Performed by: STUDENT IN AN ORGANIZED HEALTH CARE EDUCATION/TRAINING PROGRAM

## 2018-10-24 PROCEDURE — 62326 NJX INTERLAMINAR LMBR/SAC: CPT | Performed by: ANESTHESIOLOGY

## 2018-10-24 PROCEDURE — 11000001 HC ACUTE MED/SURG PRIVATE ROOM

## 2018-10-24 PROCEDURE — 59409 OBSTETRICAL CARE: CPT | Mod: AT,,, | Performed by: OBSTETRICS & GYNECOLOGY

## 2018-10-24 PROCEDURE — 27200710 HC EPIDURAL INFUSION PUMP SET: Performed by: ANESTHESIOLOGY

## 2018-10-24 PROCEDURE — 59409 OBSTETRICAL CARE: CPT | Mod: AA,,, | Performed by: ANESTHESIOLOGY

## 2018-10-24 PROCEDURE — 80048 BASIC METABOLIC PNL TOTAL CA: CPT

## 2018-10-24 RX ORDER — FENTANYL/BUPIVACAINE/NS/PF 2MCG/ML-.1
PLASTIC BAG, INJECTION (ML) INJECTION
Status: DISPENSED
Start: 2018-10-24 | End: 2018-10-24

## 2018-10-24 RX ORDER — ONDANSETRON 8 MG/1
8 TABLET, ORALLY DISINTEGRATING ORAL EVERY 8 HOURS PRN
Status: DISCONTINUED | OUTPATIENT
Start: 2018-10-24 | End: 2018-10-26 | Stop reason: HOSPADM

## 2018-10-24 RX ORDER — MISOPROSTOL 200 UG/1
TABLET ORAL
Status: DISPENSED
Start: 2018-10-24 | End: 2018-10-24

## 2018-10-24 RX ORDER — FAMOTIDINE 10 MG/ML
20 INJECTION INTRAVENOUS ONCE
Status: CANCELLED | OUTPATIENT
Start: 2018-10-24 | End: 2018-10-24

## 2018-10-24 RX ORDER — LIDOCAINE HYDROCHLORIDE AND EPINEPHRINE 15; 5 MG/ML; UG/ML
INJECTION, SOLUTION EPIDURAL
Status: DISCONTINUED | OUTPATIENT
Start: 2018-10-24 | End: 2018-10-24

## 2018-10-24 RX ORDER — IBUPROFEN 600 MG/1
600 TABLET ORAL EVERY 6 HOURS
Status: DISCONTINUED | OUTPATIENT
Start: 2018-10-24 | End: 2018-10-26 | Stop reason: HOSPADM

## 2018-10-24 RX ORDER — SODIUM CITRATE AND CITRIC ACID MONOHYDRATE 334; 500 MG/5ML; MG/5ML
30 SOLUTION ORAL ONCE
Status: CANCELLED | OUTPATIENT
Start: 2018-10-24 | End: 2018-10-24

## 2018-10-24 RX ORDER — HYDROCODONE BITARTRATE AND ACETAMINOPHEN 10; 325 MG/1; MG/1
1 TABLET ORAL EVERY 4 HOURS PRN
Status: DISCONTINUED | OUTPATIENT
Start: 2018-10-24 | End: 2018-10-26 | Stop reason: HOSPADM

## 2018-10-24 RX ORDER — HYDROCODONE BITARTRATE AND ACETAMINOPHEN 5; 325 MG/1; MG/1
1 TABLET ORAL EVERY 4 HOURS PRN
Status: DISCONTINUED | OUTPATIENT
Start: 2018-10-24 | End: 2018-10-26 | Stop reason: HOSPADM

## 2018-10-24 RX ORDER — ACETAMINOPHEN 325 MG/1
650 TABLET ORAL EVERY 6 HOURS PRN
Status: DISCONTINUED | OUTPATIENT
Start: 2018-10-24 | End: 2018-10-26 | Stop reason: HOSPADM

## 2018-10-24 RX ORDER — FENTANYL/BUPIVACAINE/NS/PF 2MCG/ML-.1
PLASTIC BAG, INJECTION (ML) INJECTION CONTINUOUS PRN
Status: DISCONTINUED | OUTPATIENT
Start: 2018-10-24 | End: 2018-10-24

## 2018-10-24 RX ORDER — DOCUSATE SODIUM 100 MG/1
200 CAPSULE, LIQUID FILLED ORAL 2 TIMES DAILY PRN
Status: DISCONTINUED | OUTPATIENT
Start: 2018-10-24 | End: 2018-10-26 | Stop reason: HOSPADM

## 2018-10-24 RX ORDER — DIPHENHYDRAMINE HYDROCHLORIDE 50 MG/ML
25 INJECTION INTRAMUSCULAR; INTRAVENOUS EVERY 4 HOURS PRN
Status: DISCONTINUED | OUTPATIENT
Start: 2018-10-24 | End: 2018-10-26 | Stop reason: HOSPADM

## 2018-10-24 RX ORDER — METHYLERGONOVINE MALEATE 0.2 MG/ML
200 INJECTION INTRAVENOUS
Status: DISCONTINUED | OUTPATIENT
Start: 2018-10-24 | End: 2018-10-26 | Stop reason: HOSPADM

## 2018-10-24 RX ORDER — CARBOPROST TROMETHAMINE 250 UG/ML
250 INJECTION, SOLUTION INTRAMUSCULAR
Status: DISCONTINUED | OUTPATIENT
Start: 2018-10-24 | End: 2018-10-26 | Stop reason: HOSPADM

## 2018-10-24 RX ORDER — METHYLERGONOVINE MALEATE 0.2 MG/ML
INJECTION INTRAVENOUS
Status: DISPENSED
Start: 2018-10-24 | End: 2018-10-24

## 2018-10-24 RX ORDER — HYDROCORTISONE 25 MG/G
CREAM TOPICAL 3 TIMES DAILY PRN
Status: DISCONTINUED | OUTPATIENT
Start: 2018-10-24 | End: 2018-10-26 | Stop reason: HOSPADM

## 2018-10-24 RX ORDER — FENTANYL/BUPIVACAINE/NS/PF 2MCG/ML-.1
PLASTIC BAG, INJECTION (ML) INJECTION CONTINUOUS
Status: CANCELLED | OUTPATIENT
Start: 2018-10-24

## 2018-10-24 RX ORDER — OXYTOCIN/RINGER'S LACTATE 20/1000 ML
10 PLASTIC BAG, INJECTION (ML) INTRAVENOUS ONCE
Status: DISCONTINUED | OUTPATIENT
Start: 2018-10-24 | End: 2018-10-26 | Stop reason: HOSPADM

## 2018-10-24 RX ORDER — OXYTOCIN/RINGER'S LACTATE 20/1000 ML
41.65 PLASTIC BAG, INJECTION (ML) INTRAVENOUS CONTINUOUS
Status: ACTIVE | OUTPATIENT
Start: 2018-10-24 | End: 2018-10-24

## 2018-10-24 RX ORDER — CARBOPROST TROMETHAMINE 250 UG/ML
INJECTION, SOLUTION INTRAMUSCULAR
Status: DISPENSED
Start: 2018-10-24 | End: 2018-10-24

## 2018-10-24 RX ORDER — DIPHENHYDRAMINE HCL 25 MG
25 CAPSULE ORAL EVERY 4 HOURS PRN
Status: DISCONTINUED | OUTPATIENT
Start: 2018-10-24 | End: 2018-10-26 | Stop reason: HOSPADM

## 2018-10-24 RX ORDER — MISOPROSTOL 200 UG/1
800 TABLET ORAL
Status: DISCONTINUED | OUTPATIENT
Start: 2018-10-24 | End: 2018-10-26 | Stop reason: HOSPADM

## 2018-10-24 RX ADMIN — IBUPROFEN 600 MG: 600 TABLET ORAL at 06:10

## 2018-10-24 RX ADMIN — VANCOMYCIN HYDROCHLORIDE 1500 MG: 1 INJECTION, POWDER, LYOPHILIZED, FOR SOLUTION INTRAVENOUS at 05:10

## 2018-10-24 RX ADMIN — LIDOCAINE HYDROCHLORIDE,EPINEPHRINE BITARTRATE 3 ML: 15; .005 INJECTION, SOLUTION EPIDURAL; INFILTRATION; INTRACAUDAL; PERINEURAL at 05:10

## 2018-10-24 RX ADMIN — IBUPROFEN 600 MG: 600 TABLET ORAL at 11:10

## 2018-10-24 RX ADMIN — ACETAMINOPHEN 650 MG: 325 TABLET, FILM COATED ORAL at 04:10

## 2018-10-24 RX ADMIN — Medication 2 MILLI-UNITS/MIN: at 03:10

## 2018-10-24 RX ADMIN — Medication 10 ML/HR: at 05:10

## 2018-10-24 RX ADMIN — Medication 41.65 MILLI-UNITS/MIN: at 10:10

## 2018-10-24 RX ADMIN — Medication 2 MILLI-UNITS/MIN: at 07:10

## 2018-10-24 NOTE — PROGRESS NOTES
Patient delivered at 0921. Pitocin 500 mL bolus given post placenta delivery. Pitocin 125 mL/hr for 3.5 hours given. Fundus firm without massage. Lochia amount: moderate without odor. Patient urinated 100 mL of urine post vaginal delivery. Vital signs stable. Patient remained afebrile. Patient transported to Mother Baby unit. No apparent distress noted. Report given to Mother Baby nurse.

## 2018-10-24 NOTE — PROGRESS NOTES
"LABOR NOTE    S:  Complaints: yes, leakage.  Epidural working:  not applicable      O: /67   Pulse 61   Temp 97.5 °F (36.4 °C) (Temporal)   Resp 18   Ht 5' 3" (1.6 m)   Wt 88 kg (194 lb)   SpO2 98%   Breastfeeding? No   BMI 34.37 kg/m²       FHT: Cat 1 (reassuring) 115 bpm, moderate btbv, + accels, no deceks  CTX: non  SSE: no herpetic lesions, copious clear fluid present  SVE: /-3      ASSESSMENT:   23 y.o.  at 38w5d, IOL    FHT reassuring    Active Hospital Problems    Diagnosis  POA    Fetal cardiac anomaly affecting pregnancy, antepartum [O35.8XX0]  Yes      Resolved Hospital Problems   No resolved problems to display.     2100: /-3  0330: /-2. SROM clear. Pitocin started    PLAN:  Continue Close Maternal/Fetal Monitoring  Begin pitocin Augmentation per protocol  Recheck 2 hours or PRN    Renate Short M.D.  PGY-4 ObGyn  [phone]  "

## 2018-10-24 NOTE — PROGRESS NOTES
Mother encouraged to provide breastmilk for her baby. Benefits of breastmilk discussed with mother. Breast pumping initiated at this time. Mother to call her nurse with further questions.

## 2018-10-24 NOTE — L&D DELIVERY NOTE
[facility]  Vaginal Delivery   Operative Note    SUMMARY       Patient admitted for induction for fetal cardiac anomalies. Patient progressed to complete with category 2 strip. Proceeded to OR 2 (next to fetal rescussitation room) and patient was set up and began pushing. Fetal position OA. Infant delivered after 2 rounds of maternal pushing. Infant was crying. Cord clamped and cut after 20 seconds and handed to NI. Cord blood obtained. Placenta delivered spontaneously. Pitocin started. Uterus firm, no clots or membranes in uterine fundus. Vaginal introitus examined and had 3 small abrasions, no tears. Hemostatic with pressure held.   Patient tolerated delivery well. Sponge needle and lap counted correctly x2.    Indications:  (spontaneous vaginal delivery)  Pregnancy complicated by:   Patient Active Problem List   Diagnosis    Single umbilical artery, maternal, antepartum    Encounter for repeat ultrasound of fetal pyelectasis in evangelista pregnancy, antepartum     (spontaneous vaginal delivery)    Fetal cardiac anomaly affecting pregnancy, antepartum     Admitting GA: 38w5d    Delivery Information for  Conrad Walker    Birth information:  YOB: 2020   Time of birth: 9:21 AM   Sex: male   Head Delivery Date/Time: 10/24/2018  9:21 AM   Delivery type: Vaginal, Spontaneous   Gestational Age: 38w5d    Delivery Providers    Delivering clinician:  Suzan Ortiz MD   Provider Role    Deangelo Moreno MD Resident    Shara Steel, RN Delivery Nurse    Nelia Morejon, RN Registered Nurse    [facility]            Measurements    Weight:  2780 g  Length:           Apgars    Living status:  Living  Apgars:   1 min.:   5 min.:   10 min.:   15 min.:   20 min.:     Skin color:   0  1       Heart rate:   2  2       Reflex irritability:   2  2       Muscle tone:   2  2       Respiratory effort:   2  2       Total:   8  9       Apgars assigned by:   NICU         Operative Delivery    Forceps attempted?:  No  Vacuum extractor attempted?:  No         Shoulder Dystocia    Shoulder dystocia present?:  No           Presentation    Presentation:  Vertex  Position:  Left Occiput Anterior           Interventions/Resuscitation    Method:  NICU Attended       Cord    Vessels:  3 vessels  Complications:  None  Delayed Cord Clamping?:  Yes  Cord Clamped Date/Time:  10/24/2018  9:21 AM  Cord Blood Disposition:  Sent with Baby  Gases Sent?:  No  Stem Cell Collection (by MD):  No       Placenta    Placenta delivery date/time:  10/24/2018 0924  Placenta removal:  Spontaneous  Placenta appearance:  Intact  Placenta disposition:  pathology           Labor Events:       labor: No     Labor Onset Date/Time:         Dilation Complete Date/Time: 10/24/2018 09:03     Start Pushing Date/Time: 10/24/2018 09:12     Rupture Date/Time: 10/24/18  0325         Rupture type:           Fluid Amount:        Fluid Color:        Fluid Odor:        Membrane Status (PeriCalm):        Rupture Date/Time (PeriCalm):        Fluid Amount (PeriCalm):        Fluid Color (PeriCalm):         steroids:       Antibiotics given for GBS: Yes     Induction:       Indications for induction:  Fetal Abnormality     Augmentation: oxytocin     Indications for augmentation:       Labor complications: None     Additional complications:          Cervical ripening:                     Delivery:      Episiotomy: None     Indication for Episiotomy:       Perineal Lacerations: None Repaired:      Periurethral Laceration: none Repaired:     Labial Laceration: none Repaired:     Sulcus Laceration: none Repaired:     Vaginal Laceration: No Repaired:     Cervical Laceration: No Repaired:     Repair suture:       Repair # of packets:       Vaginal delivery QBL (mL): 50      QBL (mL): 0     Combined Blood Loss (mL): 50     Vaginal Sweep Performed: Yes     Surgicount Correct: Yes       Other providers:        Anesthesia    Method:  Epidural          Details (if applicable):  Trial of Labor      Categorization:      Priority:     Indications for :     Incision Type:       Additional  information:  Forceps:    Vacuum:    Breech:    Observed anomalies    Other (Comments):

## 2018-10-24 NOTE — PROGRESS NOTES
"LABOR NOTE    S:  Complaints: no. Epidural working: yes  MD to bedside 2/2 prolonged fetal deceleration (to 90s for 3 minutes)    O: /72   Pulse 60   Temp 97.2 °F (36.2 °C) (Temporal)   Resp 18   Ht 5' 3" (1.6 m)   Wt 88 kg (194 lb)   SpO2 98%   Breastfeeding? No   BMI 34.37 kg/m²     FHT: 110, moderate, - accels, + prolonged decel Cat 2  CTX: q 3-5 min  SVE:     ASSESSMENT:   23 y.o.  at 38w5d, IOL 2/2 fetal cardiac defect    FHT reassuring    Active Hospital Problems    Diagnosis  POA    Fetal cardiac anomaly affecting pregnancy, antepartum [O35.8XX0]  Yes      Resolved Hospital Problems   No resolved problems to display.     2100: 60/-3  0330: /-2. SROM clear. Pitocin started  0530: /-2. Pit @ 6.  0630: 0    PLAN:  Cat 2 strip with prolonged deceleration  --likely 2/2 recent epidural & rapid cervical change  --maternal repositioning & O2 PRN  --pit turned off, will keep paused for now  --fluid bolus running  Continue Close Maternal/Fetal Monitoring  Recheck 2 hours or PRN    Edouard Tsai MD  PGY2, OBGYN [facility]    "

## 2018-10-24 NOTE — PROGRESS NOTES
STRIP NOTE    130, moderate variability, + accels, - decels (CAT 1)  Quiet toco    Plan on starting induction ~ 0300    Edouard Tsai MD  PGY2, OBGYN [facility]

## 2018-10-24 NOTE — ANESTHESIA PREPROCEDURE EVALUATION
Shereen Walker is a 23 y.o.  female admitted for IOL at 38w4d.    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    No maternal health hx; denies back surgeries, back problems and AC use.    OB History    Para Term  AB Living   1             SAB TAB Ectopic Multiple Live Births                  # Outcome Date GA Lbr Clayton/2nd Weight Sex Delivery Anes PTL Lv   1 Current                   Wt Readings from Last 1 Encounters:   10/18/18 1356 86.4 kg (190 lb 7.6 oz)       BP Readings from Last 3 Encounters:   10/18/18 124/74   10/12/18 (!) 130/90   10/11/18 (!) 132/94       Patient Active Problem List   Diagnosis    Single umbilical artery, maternal, antepartum    Encounter for repeat ultrasound of fetal pyelectasis in evangelista pregnancy, antepartum    Fetal cardiac anomaly affecting pregnancy, antepartum       Past Surgical History:   Procedure Laterality Date    FINGER SURGERY      TONSILLECTOMY, ADENOIDECTOMY, BILATERAL MYRINGOTOMY AND TUBES         Social History     Socioeconomic History    Marital status: Single     Spouse name: Not on file    Number of children: Not on file    Years of education: Not on file    Highest education level: Not on file   Social Needs    Financial resource strain: Not on file    Food insecurity - worry: Not on file    Food insecurity - inability: Not on file    Transportation needs - medical: Not on file    Transportation needs - non-medical: Not on file   Occupational History    Not on file   Tobacco Use    Smoking status: Never Smoker   Substance and Sexual Activity    Alcohol use: No    Drug use: No    Sexual activity: Yes     Partners: Male   Other Topics Concern    Not on file   Social History Narrative    Not on file          Chemistry        Component Value Date/Time     03/01/2007 0953    K 4.0 03/01/2007 0953     03/01/2007 0953    CO2 22 (L) 03/01/2007 0953    BUN 5 03/01/2007 0953    CREATININE 0.6 03/01/2007 0953    GLU 99 03/01/2007 0953        Component Value Date/Time    CALCIUM 9.4 03/01/2007 0953    ALKPHOS 132 03/01/2007 0953    AST 13 03/01/2007 0953    ALT 4 03/01/2007 0953    BILITOT 0.6 03/01/2007 0953            Lab Results   Component Value Date    WBC 5.31 01/10/2012    HGB 13.9 01/10/2012    HCT 40.6 01/10/2012    MCV 84.4 01/10/2012     01/10/2012       No results for input(s): PT, INR, PROTIME, APTT in the last 72 hours.                  Anesthesia Evaluation    I have reviewed the Patient Summary Reports.    I have reviewed the Nursing Notes.   I have reviewed the Medications.     Review of Systems  Anesthesia Hx:  Neg history of prior surgery. Denies Family Hx of Anesthesia complications.    Hematology/Oncology:         -- Anemia:   Cardiovascular:  Cardiovascular Normal     Pulmonary:  Pulmonary Normal    Renal/:  Renal/ Normal     Hepatic/GI:  Hepatic/GI Normal    Neurological:  Neurology Normal        Physical Exam  General:  Well nourished    Airway/Jaw/Neck:  Airway Findings: Mouth Opening: Normal Tongue: Normal  Mallampati: II  TM Distance: Normal, at least 6 cm     Eyes/Ears/Nose:  EYES/EARS/NOSE FINDINGS: Normal   Dental:  Dental Findings: In tact   Chest/Lungs:  Chest/Lungs Findings: Clear to auscultation     Heart/Vascular:  Heart Findings: Rate: Normal  Rhythm: Regular Rhythm        Mental Status:  Mental Status Findings:  Cooperative, Alert and Oriented         Anesthesia Plan  Type of Anesthesia, risks & benefits discussed:  Anesthesia Type:  epidural, CSE, general, spinal  Patient's Preference: labor epidural  Intra-op Monitoring Plan: standard ASA monitors  Intra-op Monitoring Plan Comments:   Post Op Pain Control Plan: multimodal analgesia, IV/PO Opioids PRN and per  primary service following discharge from PACU  Post Op Pain Control Plan Comments:   Induction:   IV  Beta Blocker:  Patient is not currently on a Beta-Blocker (No further documentation required).       Informed Consent: Patient understands risks and agrees with Anesthesia plan.  Questions answered. Anesthesia consent signed with patient.  ASA Score: 2     Day of Surgery Review of History & Physical:  There are no significant changes.  H&P update referred to the surgeon.     Anesthesia Plan Notes: Discussed anesthetic risks associated with labor epidural including infection, bleeding, dural puncture, and neurologic complications        Ready For Surgery From Anesthesia Perspective.

## 2018-10-24 NOTE — ANESTHESIA PROCEDURE NOTES
Epidural    Patient location during procedure: OB   Reason for block: primary anesthetic   Diagnosis: IUP   Start time: 10/24/2018 5:30 AM  Timeout: 10/24/2018 5:28 AM  End time: 10/24/2018 5:49 AM  Surgery related to: Vaginal Delivery  Staffing  Anesthesiologist: John Paul Zavala MD  Resident/CRNA: Tata Manuel MD  Performed: resident/CRNA   Preanesthetic Checklist  Completed: patient identified, site marked, surgical consent, pre-op evaluation, timeout performed, IV checked, risks and benefits discussed, monitors and equipment checked, anesthesia consent given, hand hygiene performed and patient being monitored  Preparation  Patient position: sitting  Prep: ChloraPrep  Patient monitoring: Pulse Ox  Epidural  Skin Anesthetic: lidocaine 1%  Skin Wheal: 3 mL  Administration type: continuous  Approach: midline  Interspace: L3-4  Injection technique: ILIA saline  Needle and Epidural Catheter  Needle type: Tuohy   Needle gauge: 17  Needle length: 3.5 inches  Needle insertion depth: 7 cm  Catheter type: springwound  Catheter size: 19 G  Catheter at skin depth: 12 cm  Test dose: 3 mL of lidocaine 1.5% with Epi 1-to-200,000  Additional Documentation: incremental injection, negative aspiration for heme and CSF, no paresthesia on injection, no signs/symptoms of IV or SA injection, no significant pain on injection and no significant complaints from patient  Needle localization: anatomical landmarks  Medications:  Volume per aspiration: 5 mL  Time between aspirations: 5 minutes  Assessment  Ease of block: easy  Patient's tolerance of the procedure: comfortable throughout block and no complaints

## 2018-10-24 NOTE — H&P
HISTORY AND PHYSICAL                                                OBSTETRICS          Subjective:       Shereen Walker is a 23 y.o.  female with IUP at 38w4d weeks gestation who presents for scheduled IOL in a pregnancy complicated by fetal heart anomaly, single US, fetal pyelectasis with suspected DiGeorge syndrome of the fetus  Denies HA, vision changes, CP, SOB, RUQ pain, regular contractions, LOF, VB or decreased fetal movement.    PMHx: No past medical history on file.    PSHx:   Past Surgical History:   Procedure Laterality Date    FINGER SURGERY      TONSILLECTOMY, ADENOIDECTOMY, BILATERAL MYRINGOTOMY AND TUBES         All:   Review of patient's allergies indicates:   Allergen Reactions    Suprax [cefixime] Hives       Meds:   Medications Prior to Admission   Medication Sig Dispense Refill Last Dose    NORETHINDRONE-E.ESTRADIOL-IRON (MINASTRIN 24 FE ORAL) Take by mouth.   Taking    valACYclovir (VALTREX) 500 MG tablet Take 500 mg by mouth once daily.  0 Taking       SH:   Social History     Socioeconomic History    Marital status: Single     Spouse name: Not on file    Number of children: Not on file    Years of education: Not on file    Highest education level: Not on file   Social Needs    Financial resource strain: Not on file    Food insecurity - worry: Not on file    Food insecurity - inability: Not on file    Transportation needs - medical: Not on file    Transportation needs - non-medical: Not on file   Occupational History    Not on file   Tobacco Use    Smoking status: Never Smoker   Substance and Sexual Activity    Alcohol use: No    Drug use: No    Sexual activity: Yes     Partners: Male   Other Topics Concern    Not on file   Social History Narrative    Not on file       FH:   Family History   Problem Relation Age of Onset    Hypertension Maternal Grandmother     Diabetes Maternal Grandmother     Arrhythmia Neg Hx     Cardiomyopathy Neg Hx     Congenital heart  disease Neg Hx     Heart attacks under age 50 Neg Hx     Pacemaker/defibrilator Neg Hx        OBHx:   Obstetric History       T0      L0     SAB0   TAB0   Ectopic0   Multiple0   Live Births0       # Outcome Date GA Lbr [place]/2nd Weight Sex Delivery Anes PTL Lv   1 Current                   Objective:       Breastfeeding? No     There were no vitals filed for this visit.    General:   alert, appears stated age and cooperative   Lungs:   normal respiratory effort   Heart:   regular rate   Abdomen:  gravid nontender abdomen   Extremities negative edema, negative erythema   FHT: Cat 1 (reassuring), 135 bpm, moderate btbv, + accels, no decels                 TOCO: Q 4 minutes   Presentations: cephalic by ultrasound   Cervix:     Dilation: 4    Effacement: 75%    Station:  -2    Consistency: medium    Position: anterior   Sterile Speculum Exam: deferred    EFW by [facility]: 6#8    Lab Review  Blood Type B+  GBBS: negative  Rubella: non Immune  RPR: NR  HIV: negative  HepB: negative       Assessment:       38w4d weeks gestation here for IOL due to fetal anomaly    Active Hospital Problems    Diagnosis  POA    Fetal cardiac anomaly affecting pregnancy, antepartum [O35.8XX0]  Yes      Resolved Hospital Problems   No resolved problems to display.          Plan:      Risks, benefits, alternatives and possible complications have been discussed in detail with the patient.   - Consents signed and to chart  - Admit to Labor and Delivery unit  - Begin pitocin induction in the morning when all teams are well prepared. Alert NICU of patient's arrival.   - Epidural per Anesthesia  - Draw CBC, T&S, RPR, HIV  - Notify Staff  - Recheck PRN  - initial BP elevated. Will repeat once settled and if increased, draw Pre E labs    Post-Partum Hemorrhage risk - low        Renate Short M.D.  PGY-4 ObGyn  [phone]

## 2018-10-24 NOTE — PROGRESS NOTES
"LABOR NOTE    S:  Complaints: yes, mild cramping.  Epidural working:  not applicable      O: BP (!) 146/97   Pulse 68   Temp 97.2 °F (36.2 °C) (Temporal)   Resp 18   Ht 5' 3" (1.6 m)   Wt 88 kg (194 lb)   SpO2 98%   Breastfeeding? No   BMI 34.37 kg/m²       FHT: 135, moderate, + accels, + occasional decels (lates, variables) Cat 2  CTX: q 3-5 min  SVE: /-2    ASSESSMENT:   23 y.o.  at 38w5d, IOL 2/2 fetal cardiac defect    FHT reassuring    Active Hospital Problems    Diagnosis  POA    Fetal cardiac anomaly affecting pregnancy, antepartum [O35.8XX0]  Yes      Resolved Hospital Problems   No resolved problems to display.     2100: 460/-3  0330: 80/-2. SROM clear. Pitocin started  0530: /-2. Pit @ 6.    PLAN:  Cat 2 strip: overall reassuring (good btbv) maternal repositioning PRN  Making cervical change. Pitocin @ 6. Augmentation per protocol.  Continue Close Maternal/Fetal Monitoring  Recheck 2 hours or PRN    Edouard Tsai MD  PGY2, OBGYN [facility]    "

## 2018-10-25 LAB
BASOPHILS # BLD AUTO: 0.02 K/UL
BASOPHILS NFR BLD: 0.2 %
DIFFERENTIAL METHOD: ABNORMAL
EOSINOPHIL # BLD AUTO: 0.1 K/UL
EOSINOPHIL NFR BLD: 1.1 %
ERYTHROCYTE [DISTWIDTH] IN BLOOD BY AUTOMATED COUNT: 12.9 %
HCT VFR BLD AUTO: 33.2 %
HGB BLD-MCNC: 11.1 G/DL
LYMPHOCYTES # BLD AUTO: 2.6 K/UL
LYMPHOCYTES NFR BLD: 25.1 %
MCH RBC QN AUTO: 29.2 PG
MCHC RBC AUTO-ENTMCNC: 33.4 G/DL
MCV RBC AUTO: 87 FL
MONOCYTES # BLD AUTO: 0.5 K/UL
MONOCYTES NFR BLD: 5.2 %
NEUTROPHILS # BLD AUTO: 6.9 K/UL
NEUTROPHILS NFR BLD: 68.1 %
PLATELET # BLD AUTO: 199 K/UL
PMV BLD AUTO: 10.8 FL
RBC # BLD AUTO: 3.8 M/UL
WBC # BLD AUTO: 10.17 K/UL

## 2018-10-25 PROCEDURE — 11000001 HC ACUTE MED/SURG PRIVATE ROOM

## 2018-10-25 PROCEDURE — 85025 COMPLETE CBC W/AUTO DIFF WBC: CPT

## 2018-10-25 PROCEDURE — 25000003 PHARM REV CODE 250: Performed by: STUDENT IN AN ORGANIZED HEALTH CARE EDUCATION/TRAINING PROGRAM

## 2018-10-25 PROCEDURE — 36415 COLL VENOUS BLD VENIPUNCTURE: CPT

## 2018-10-25 PROCEDURE — 99231 SBSQ HOSP IP/OBS SF/LOW 25: CPT | Mod: ,,, | Performed by: PEDIATRICS

## 2018-10-25 RX ORDER — IBUPROFEN 600 MG/1
600 TABLET ORAL EVERY 6 HOURS
Qty: 30 TABLET | Refills: 1 | Status: ON HOLD | OUTPATIENT
Start: 2018-10-25 | End: 2021-10-21

## 2018-10-25 RX ADMIN — ACETAMINOPHEN 650 MG: 325 TABLET, FILM COATED ORAL at 12:10

## 2018-10-25 RX ADMIN — IBUPROFEN 600 MG: 600 TABLET ORAL at 11:10

## 2018-10-25 RX ADMIN — ACETAMINOPHEN 650 MG: 325 TABLET, FILM COATED ORAL at 10:10

## 2018-10-25 RX ADMIN — IBUPROFEN 600 MG: 600 TABLET ORAL at 05:10

## 2018-10-25 RX ADMIN — IBUPROFEN 600 MG: 600 TABLET ORAL at 06:10

## 2018-10-25 NOTE — DISCHARGE INSTRUCTIONS
Preparation and Hygiene:    1. Shower daily.  2. Wear a clean bra each day and wash daily in warm soapy water.  3. Change wet or moist breast pads frequently.  Moist pads can promote growth of germs.  4. Actively wash your hands, paying close attention to the area around and under your fingernails, thoroughly with soap and water for 15 seconds before pumping or handling your milk.  Re-wash your hands if you touch anything (scratching your nose, answering the phone, etc) while pumping or handling your milk.   5. Before pumping your breasts, assemble the pump collection kit and have ready the sterile container and labels.  Place these items on a clean surface next to the breastpump.  6. Each time after you have finished pumping, take apart all of the parts of the breastpump collection kit and place them in a separate cleaning container (do not place them in the sink).  Be sure to remove the yellow valve from the breastshield and separate the white membrane from the yellow valve.  Rinse all of these parts with cool water.  Then use a new sponge and/or bottle brush and dishwashing detergent to clean the parts.  Rinse off the soapy water with cool water and air dry on a clean towel covered with a clean cloth.  All parts may also be washed after each use in the top rack of a .  7. Once each day, sterilize all of the parts of the breastpump collection kit.  This can be done by boiling the kit parts for 10 minutes or by using a Quick Clean Micro-Steam Bag made by [employer]  8. If condensation appears in the tubing, continue to run the pump with the tubing attached for 1-2 minutes or until the tubing is dry.   9. Notify your babys nurse or doctor if you become ill or need to take any medication, even over-the-counter medicines.        Collection and Storage of Expressed Breastmilk:         1. Pump your breasts at least 8-10 times every 24 hours.  Double pump (both breasts at  the same time) for at least 15-20  minutes using the most suction that is comfortable.    2. Write the date and time of pumping and the name of any medications you are takingon the babys pre-printed hospital identification label.   3. Place your babys pre-printed hospital identification label on each container of breastmilk.  Additional pre-printed labels can be obtained from your babys nurse.  If your expressed breastmilk is not correctly labeled, the nurse cannot feed the milk to the baby.       4. Place a brightly colored sticker on the top of each container of milk pumped during the first 30 days.  This identifies the milk as special and having higher levels of nutrients and anti-infective properties that are so important for your baby.  Additional stickers can be obtained from the lactation consultants or your babys nurse.  5.   Do not touch the inside of the storage containers or lids.  6.      Pour the amount of expressed milk needed for 1 of your babys feedings into each   storage container. Use a new container(s) for each pumping.  Additional storage   containers can be obtained from your babys nurse.        7.       Tightly screw the lid onto the container and place immediately into the       refrigerator fordaily transportation to the hospital.   Do not freeze your milk      unless asked to do so by your babys nurse.  However, if you are not able to      visit your baby each day, place the expressed breastmilk in the freezer.  8.   Expressed breastmilk should be refrigerated or frozen within 1 hour of      pumping.  9.      Do not store expressed breastmilk on the door of your refrigerator or freezer where the temperature is warmer.         Transportation of Expressed Breastmilk:    1. Refrigerated breastmilk or frozen milk should be packed tightly together in a cooler with frozen, blue gel-packs to keep the milk frozen.  DO NOT USE ICE CUBES (WET ICE) TO TRANSPORT FROZEN MILK.   A clean towel can be used to fill any extra space  between containers of frozen milk.  2.    Bring your expressed milk from home each time you visit the baby.

## 2018-10-25 NOTE — LACTATION NOTE
10/25/18 1030   Infant Information   Infant's Name Sienna   Maternal Infant Assessment   Breast Shape Bilateral:;pendulous   Breast Density Bilateral:;soft   Areola Bilateral:;elastic   Nipple(s) Bilateral:;everted   Maternal Infant Feeding   Maternal Emotional State relaxed   Time Spent (min) 15-30 min   Breastfeeding Education adequate milk volume;increasing milk supply;label/storage of breast milk;milk expression, electric pump;milk expression, hand;importance of skin-to-skin contact   Equipment Type/Education   Pump Type Symphony   Breast Pump Type double electric, hospital grade   Breast Pump Flange Type hard   Breast Pump Flange Size 27 mm   Breast Pumping Bilateral Breasts:;pumped until emptied   Pumping Frequency (times) (8 or more in 24hrs encouraged)   Lactation Referrals   Lactation Consult Initial assessment;Knowledge deficit;Pump teaching   Lactation Interventions   Attachment Promotion breastfeeding assistance provided;skin-to-skin contact encouraged;counseling provided;privacy provided;environment adjusted   Breastfeeding Assistance electric breast pump used;support offered;milk expression/pumping   Maternal Breastfeeding Support lactation counseling provided;encouragement offered   LC gave and reviewed Blue lactation NICU folder. Assisted mother with pumping session. Taught hand expression and encouraged after pumping session. Mother is getting a Spectra pump from sister, discussed  recommendation of not sharing personal use pumps, offered [facility] rental pump. 6ml collected. Labels provided. Reviewed pump use, parts, cleaning, sterilizing daily, breastmilk storage/handling/labeling. LC number on board, encouraged to call for any further questions.

## 2018-10-25 NOTE — ANESTHESIA POSTPROCEDURE EVALUATION
"Anesthesia Post Evaluation    Patient: Shereen Walker    Procedure(s) Performed: * No procedures listed *    Final Anesthesia Type: epidural  Patient location during evaluation: floor  Patient participation: Yes- Able to Participate  Level of consciousness: awake and alert  Post-procedure vital signs: reviewed and stable  Pain management: adequate  Airway patency: patent  PONV status at discharge: No PONV  Anesthetic complications: no      Cardiovascular status: blood pressure returned to baseline  Respiratory status: spontaneous ventilation, unassisted and room air  Hydration status: euvolemic  Follow-up not needed.        Visit Vitals  BP (!) 163/83 (BP Location: Left arm, Patient Position: Sitting)   Pulse 70   Temp 36.9 °C (98.4 °F) (Oral)   Resp 18   Ht 5' 3" (1.6 m)   Wt 88 kg (194 lb)   SpO2 97%   Breastfeeding? Yes   BMI 34.37 kg/m²       Pain/Naya Score: Pain Rating Prior to Med Admin: 2 (10/25/2018 12:48 PM)  Pain Rating Post Med Admin: 1 (10/25/2018  1:45 PM)        "

## 2018-10-25 NOTE — PROGRESS NOTES
POSTPARTUM PROGRESS NOTE     Shereen Walker is a 23 y.o. female PPD #1 status post Spontaneous vaginal delivery at 38w5d in a pregnancy complicated by fetal heart anomaly, single US, fetal pyelectasis and suspected DiGeorge syndrome of fetus. Patient is doing well this morning. She denies nausea, vomiting, fever or chills.  Patient reports mild abdominal pain that is well relieved by oral pain medications. Lochia is mild and decreasing. Patient is voiding without difficulty and ambulating with no difficulty. She has passed flatus, and has not had BM.  Patient does plan to breast feed for baby currently in NICU. Defer to post partum visit with Dr. Miller (primary OBGYN) for contraception. She desires circumcision - consents signed this AM.     Objective:       Temp:  [96.1 °F (35.6 °C)-98.1 °F (36.7 °C)] 97.8 °F (36.6 °C)  Pulse:  [] 61  Resp:  [17-18] 18  SpO2:  [97 %-100 %] 97 %  BP: (104-141)/(56-86) 104/56    General:   alert, appears stated age and cooperative   Lungs:   clear to auscultation bilaterally   Heart:   regular rate and rhythm, S1, S2 normal, no murmur, click, rub or gallop   Abdomen:  soft, non-tender; bowel sounds normal; no masses,  no organomegaly   Uterus:  firm located at the umblicus.    Extremities: peripheral pulses normal, no pedal edema, no clubbing or cyanosis     Lab Review  Recent Results (from the past 4 hour(s))   CBC auto differential    Collection Time: 10/25/18  4:03 AM   Result Value Ref Range    WBC 10.17 3.90 - 12.70 K/uL    RBC 3.80 (L) 4.00 - 5.40 M/uL    Hemoglobin 11.1 (L) 12.0 - 16.0 g/dL    Hematocrit 33.2 (L) 37.0 - 48.5 %    MCV 87 82 - 98 fL    MCH 29.2 27.0 - 31.0 pg    MCHC 33.4 32.0 - 36.0 g/dL    RDW 12.9 11.5 - 14.5 %    Platelets 199 150 - 350 K/uL    MPV 10.8 9.2 - 12.9 fL    Gran # (ANC) 6.9 1.8 - 7.7 K/uL    Lymph # 2.6 1.0 - 4.8 K/uL    Mono # 0.5 0.3 - 1.0 K/uL    Eos # 0.1 0.0 - 0.5 K/uL    Baso # 0.02 0.00 - 0.20 K/uL    Gran% 68.1 38.0 - 73.0 %     Lymph% 25.1 18.0 - 48.0 %    Mono% 5.2 4.0 - 15.0 %    Eosinophil% 1.1 0.0 - 8.0 %    Basophil% 0.2 0.0 - 1.9 %    Differential Method Automated        I/O    Intake/Output Summary (Last 24 hours) at 10/25/2018 0619  Last data filed at 10/24/2018 2050  Gross per 24 hour   Intake 675.93 ml   Output 2200 ml   Net -1524.07 ml        Assessment:     Patient Active Problem List   Diagnosis    Single umbilical artery, maternal, antepartum    Encounter for repeat ultrasound of fetal pyelectasis in [facility] pregnancy, antepartum     (spontaneous vaginal delivery)    Fetal cardiac anomaly affecting pregnancy, antepartum        Plan:   1. Postpartum care s/p   - Patient doing well. Continue routine management and advances.  - Continue PO pain meds. Pain well controlled.  - Heme: H/h: >  - Encourage ambulation  - Circumcision desired - consents signed this AM  - Contraception - defer to post partum visit with primary OBGYN  - Lactation consult PRN      Dispo: As patient meets milestones, will plan to discharge PPD #1-2.    Jackie Garcia MD  OB/GYN  PGY-1

## 2018-10-25 NOTE — PLAN OF CARE
Problem: Patient Care Overview  Goal: Plan of Care Review  Outcome: Ongoing (interventions implemented as appropriate)  Patient to follow basic lactation education for pumping for NICU baby.

## 2018-10-25 NOTE — PLAN OF CARE
COPIED FROM INFANT'S CHART    SOCIAL WORK DISCHARGE PLANNING ASSESSMENT     Sw completed discharge planning assessment with pt's parents in mother's room 600.  Pt's parents were easily engaged. Education on the role of  was provided. Emotional support provided throughout assessment.        Legal Name: Sienna Jade                                         :  10/24/2018         Patient Active Problem List   Diagnosis    Congenital heart defect    PDA (patent ductus arteriosus)    Hydronephrosis    Single umbilical artery            [facility]irth [facility]           JOSSELIN: 18     Birth Weight:   2.78 kg (6 lb 2.1 oz)                Birth Length: 49.0cm               Gestational Age: 38w5d           Apgars    Living status:  Living  Apgars:   1 min.:   5 min.:   10 min.:   15 min.:   20 min.:     Skin color:   0  1          Heart rate:   2  2          Reflex irritability:   2  2          Muscle tone:   2  2          Respiratory effort:   2  2          Total:   8  9          Apgars assigned by:  NICU            Mother: Shereen Walker, age 25,  1993   Address: [address] [place] [address] [place] [address]  Phone: [phone]     Father: Harshil Jade, age 23,  6/15/1995  Address: same as above  Phone: [phone]     Signed Birth Certificate: Yes; parents are in a relationship     Support person(s): Laure Walker ([facility]) [phone] and Miriam Rodarte ([facility]) [phone]     Sibling(s): paternal: Zabrina     Spiritual Affiliation: None     Commercial Insurance Coverage: No      [facility] Health Plan (formerly [place] Medicaid): Primary: Yes Secondary: No   [employer]      Pediatrician: Prefers [facility].  List provided.  Mom to select MD and inform bedside RN       Nutrition: Expressed Breast Milk               Breast Pump:              Yes               Has obtained a pump               WIC:              Mom already certified; will also apply for         Essential Items: (includes car seat,  crib/bassinet/pack-n-play, clothing, bottles, diapers, etc.)  Acquired      Transportation: Personal vehicle      Education: Information given on CPR classes and Physician/NNP daily rounds.      Resources Given: SSI Benefits, [employer], and [facility].        Potential Eligibility for SSI Benefits: Yes. Sw to provide diagnosis letter for application process.     Potential Discharge Needs:  None         [facility]  [place]   Phone [phone] [phone]  [email or web address]

## 2018-10-26 VITALS
SYSTOLIC BLOOD PRESSURE: 127 MMHG | HEART RATE: 64 BPM | OXYGEN SATURATION: 98 % | HEIGHT: 63 IN | WEIGHT: 194 LBS | RESPIRATION RATE: 18 BRPM | BODY MASS INDEX: 34.38 KG/M2 | DIASTOLIC BLOOD PRESSURE: 89 MMHG | TEMPERATURE: 98 F

## 2018-10-26 PROCEDURE — 25000003 PHARM REV CODE 250: Performed by: STUDENT IN AN ORGANIZED HEALTH CARE EDUCATION/TRAINING PROGRAM

## 2018-10-26 PROCEDURE — 90710 MMRV VACCINE SC: CPT | Performed by: STUDENT IN AN ORGANIZED HEALTH CARE EDUCATION/TRAINING PROGRAM

## 2018-10-26 PROCEDURE — 90715 TDAP VACCINE 7 YRS/> IM: CPT | Performed by: STUDENT IN AN ORGANIZED HEALTH CARE EDUCATION/TRAINING PROGRAM

## 2018-10-26 PROCEDURE — 63600175 PHARM REV CODE 636 W HCPCS: Performed by: STUDENT IN AN ORGANIZED HEALTH CARE EDUCATION/TRAINING PROGRAM

## 2018-10-26 PROCEDURE — 90472 IMMUNIZATION ADMIN EACH ADD: CPT | Performed by: STUDENT IN AN ORGANIZED HEALTH CARE EDUCATION/TRAINING PROGRAM

## 2018-10-26 PROCEDURE — 90471 IMMUNIZATION ADMIN: CPT | Performed by: STUDENT IN AN ORGANIZED HEALTH CARE EDUCATION/TRAINING PROGRAM

## 2018-10-26 RX ADMIN — IBUPROFEN 600 MG: 600 TABLET ORAL at 12:10

## 2018-10-26 RX ADMIN — CLOSTRIDIUM TETANI TOXOID ANTIGEN (FORMALDEHYDE INACTIVATED), CORYNEBACTERIUM DIPHTHERIAE TOXOID ANTIGEN (FORMALDEHYDE INACTIVATED), BORDETELLA PERTUSSIS TOXOID ANTIGEN (GLUTARALDEHYDE INACTIVATED), BORDETELLA PERTUSSIS FILAMENTOUS HEMAGGLUTININ ANTIGEN (FORMALDEHYDE INACTIVATED), BORDETELLA PERTUSSIS PERTACTIN ANTIGEN, AND BORDETELLA PERTUSSIS FIMBRIAE 2/3 ANTIGEN 0.5 ML: 5; 2; 2.5; 5; 3; 5 INJECTION, SUSPENSION INTRAMUSCULAR at 10:10

## 2018-10-26 RX ADMIN — IBUPROFEN 600 MG: 600 TABLET ORAL at 06:10

## 2018-10-26 RX ADMIN — IBUPROFEN 600 MG: 600 TABLET ORAL at 11:10

## 2018-10-26 RX ADMIN — MEASLES, MUMPS, AND RUBELLA VIRUS VACCINE LIVE 0.5 ML: 1000; 12500; 1000 INJECTION, POWDER, LYOPHILIZED, FOR SUSPENSION SUBCUTANEOUS at 10:10

## 2018-10-26 RX ADMIN — ACETAMINOPHEN 650 MG: 325 TABLET, FILM COATED ORAL at 03:10

## 2018-10-26 NOTE — LACTATION NOTE
10/26/18 0950   Maternal Infant Assessment   Breast Shape Bilateral:;pendulous   Breast Density Bilateral:;soft   Areola Bilateral:;elastic   Nipple(s) Bilateral:;everted   Maternal Infant Feeding   Time Spent (min) 15-30 min   Breastfeeding Education adequate infant intake;adequate milk volume;diet;importance of skin-to-skin contact;increasing milk supply;label/storage of breast milk;medication effects;milk expression, electric pump;milk expression, hand;prenatal vitamins continued;returning to work   Equipment Type/Education   Pump Type Symphony   Breast Pump Type double electric, hospital grade   Breast Pump Flange Type hard   Breast Pump Flange Size 27 mm   Breast Pumping Bilateral Breasts:;pumped until emptied   Pumping Frequency (times) (8 or more times daily)   Lactation Referrals   Lactation Consult Pump teaching;Follow up;Knowledge deficit   Lactation Interventions   Attachment Promotion counseling provided;skin-to-skin contact encouraged   Breastfeeding Assistance support offered;milk expression/pumping;electric breast pump used   LC provided lactation discharge education for mother pumping for infant in NICU. Also provided the breastfeeding guide and reviewed information. Mother has Spectra pump for home use, aware of rental pump option. Encouraged mother to pump 8 or more in 24hrs, hand express after pumpings, and to do STS. Reviewed pump use, parts, cleaning, sterilizing, milk storage/handling/labeling. LC number on board. Mother has lactation number for after discharge.

## 2018-10-26 NOTE — PROGRESS NOTES
POSTPARTUM PROGRESS NOTE     Shereen Walker is a 23 y.o. female PPD #2 status post Spontaneous vaginal delivery at 38w5d in a pregnancy complicated by fetal heart anomaly, single US, fetal pyelectasis and suspected DiGeorge syndrome of fetus. Patient is doing well this morning. She denies nausea, vomiting, fever or chills.  Patient reports mild abdominal pain that is well relieved by oral pain medications. Lochia is mild and decreasing. Patient is voiding without difficulty and ambulating with no difficulty. She has passed flatus, and has had BM.  Patient does plan to breast feed for baby currently in NICU - currently pumping. Defer to post partum visit with Dr. Miller (primary OBGYN) for contraception. She desires circumcision - consents signed.     Objective:       Temp:  [97.6 °F (36.4 °C)-98.4 °F (36.9 °C)] 97.6 °F (36.4 °C)  Pulse:  [70-85] 85  Resp:  [18] 18  SpO2:  [97 %-98 %] 98 %  BP: (124-163)/(78-88) 138/88    General:   alert, appears stated age and cooperative   Lungs:   clear to auscultation bilaterally   Heart:   regular rate and rhythm, S1, S2 normal, no murmur, click, rub or gallop   Abdomen:  soft, non-tender; bowel sounds normal; no masses,  no organomegaly   Uterus:  firm located at the umblicus.    Extremities: peripheral pulses normal, no pedal edema, no clubbing or cyanosis     Lab Review  No results found for this or any previous visit (from the past 4 hour(s)).    I/O  No intake or output data in the 24 hours ending 10/26/18 0657     Assessment:     Patient Active Problem List   Diagnosis    Single umbilical artery, maternal, antepartum    Encounter for repeat ultrasound of fetal pyelectasis in [facility] pregnancy, antepartum     (spontaneous vaginal delivery)    Fetal cardiac anomaly affecting pregnancy, antepartum        Plan:   1. Postpartum care s/p   - Patient doing well. Continue routine management and advances.  - Continue PO pain meds. Pain well controlled.  - Heme: H/h:  12/36>11/33  - Encourage ambulation  - Circumcision desired - consents signed  - Contraception - defer to post partum visit with primary OBGYN  - Lactation consult PRN      Dispo: As patient meets milestones, will plan to discharge today.     Jackie Garcia MD  OB/GYN  PGY-1

## 2018-10-26 NOTE — DISCHARGE SUMMARY
Delivery Discharge Summary  Obstetrics      Primary OB Clinician: Dr. Seferino Miller    Admission date: 10/23/2018  Discharge date: 10/26/2018    Disposition: To home, self care    Admit Dx:      Patient Active Problem List   Diagnosis    Single umbilical artery, maternal, antepartum    Encounter for repeat ultrasound of fetal pyelectasis in [facility] pregnancy, antepartum     (spontaneous vaginal delivery)    Fetal cardiac anomaly affecting pregnancy, antepartum     Discharge Dx:    Patient Active Problem List   Diagnosis    Single umbilical artery, maternal, antepartum    Encounter for repeat ultrasound of fetal pyelectasis in [facility] pregnancy, antepartum     (spontaneous vaginal delivery)    Fetal cardiac anomaly affecting pregnancy, antepartum       Procedure:     Hospital Course:  Shereen Walker is a 23 y.o. now , PPD #2 who was admitted on 10/23/2018 at 38w4d for scheduled IOL in pregnancy complicated by fetal heart anomaly, single US, fetal pyelectasis with suspected DiGeorge syndrome of fetus. On initial assessment, vital signs were stable and physical exam was normal. Infant was in cephalic presentation. Patient was subsequently admitted to labor and delivery unit with signed consents. Labor course was managed with pitocin.  Patient delivered a single viable  male. Please see delivery note for further details. Pt was in stable condition post delivery and was transferred to the Mother-Baby Unit. Her postpartum course was uncomplicated. On discharge day, patient's pain is controlled with oral pain medications. Pt is tolerating ambulation without SOB or CP, and PO diet without N/V. Reports lochia is mild. Denies any HA, vision changes, F/C, LE swelling. Denies any breast pain/soreness.  Pt in stable condition and ready for discharge. She has been instructed to continue pain medications as needed and to follow up in the OB clinic in 6 weeks with her obstetrics  provider.    Pertinent studies:  Postpartum CBC  Lab Results   Component Value Date    WBC 10.17 10/25/2018    HGB 11.1 (L) 10/25/2018    HCT 33.2 (L) 10/25/2018    MCV 87 10/25/2018     10/25/2018     Tubal Ligation: n/a  Feeding Method: breast- pumping for baby in NICU  Rh Immune Globulin Given(B POS): N/A  Rubella Vaccine Given: Non Immune  Tdap Vaccine Given: to be given on discharge    Delivery:    Episiotomy: None   Lacerations: None   Repair suture:     Repair # of packets:     Blood loss (ml): 50     Birth information:  YOB: 2020   Time of birth: 9:21 AM   Sex: male   Delivery type: Vaginal, Spontaneous   Gestational Age: 38w5d    Delivery Clinician:      Other providers:       Additional  information:  Forceps:    Vacuum:    Breech:    Observed anomalies      Living?:           APGARS  One minute Five minutes Ten minutes   Skin color:         Heart rate:         Grimace:         Muscle tone:         Breathing:         Totals: 8  9        Placenta: Delivered:       appearance      Patient Instructions:   Current Discharge Medication List      START taking these medications    Details   ibuprofen (ADVIL,MOTRIN) 600 MG tablet Take 1 tablet (600 mg total) by mouth every 6 (six) hours.  Qty: 30 tablet, Refills: 1         CONTINUE these medications which have NOT CHANGED    Details   NORETHINDRONE-E.ESTRADIOL-IRON (MINASTRIN 24 FE ORAL) Take by mouth.      valACYclovir (VALTREX) 500 MG tablet Take 500 mg by mouth once daily.  Refills: 0             Discharge Procedure Orders   Diet Adult Regular     Notify your health care provider if you experience any of the following:  temperature >100.4     Notify your health care provider if you experience any of the following:  persistent nausea and vomiting or diarrhea     Notify your health care provider if you experience any of the following:  severe uncontrolled pain     Notify your health care provider if you experience any of the following:   difficulty breathing or increased cough     Notify your health care provider if you experience any of the following:  severe persistent headache     Notify your health care provider if you experience any of the following:  worsening rash     Notify your health care provider if you experience any of the following:  persistent dizziness, light-headedness, or visual disturbances     Notify your health care provider if you experience any of the following:  increased confusion or weakness     Notify your health care provider if you experience any of the following:   Order Comments: Notify MD if bleeding 1 pad/hour for 2 consecutive hours.     No dressing needed     Activity as tolerated     Jackie Garcia MD  OB/GYN  PGY-1

## 2018-10-26 NOTE — PLAN OF CARE
Problem: Patient Care Overview  Goal: Plan of Care Review  Outcome: Ongoing (interventions implemented as appropriate)  Patient to continue pumping 8 or more times in a 24hr period for infant in NICU. Follow discharge lactation education.

## 2021-03-26 LAB
C TRACH RRNA SPEC QL PROBE: NEGATIVE
HBV SURFACE AG SERPL QL IA: NEGATIVE
HIV 1+2 AB+HIV1 P24 AG SERPL QL IA: NEGATIVE
INDIRECT COOMBS: NEGATIVE
N GONORRHOEAE, AMPLIFIED DNA: NEGATIVE
RPR: NONREACTIVE
RUBELLA IMMUNE STATUS: NORMAL

## 2021-09-10 ENCOUNTER — HOSPITAL ENCOUNTER (OUTPATIENT)
Facility: HOSPITAL | Age: 26
Discharge: HOME | End: 2021-09-10
Attending: OBSTETRICS & GYNECOLOGY | Admitting: OBSTETRICS & GYNECOLOGY
Payer: MEDICAID

## 2021-09-10 VITALS
HEART RATE: 110 BPM | WEIGHT: 186 LBS | SYSTOLIC BLOOD PRESSURE: 130 MMHG | BODY MASS INDEX: 32.96 KG/M2 | RESPIRATION RATE: 16 BRPM | DIASTOLIC BLOOD PRESSURE: 85 MMHG | OXYGEN SATURATION: 97 % | TEMPERATURE: 98 F | HEIGHT: 63 IN

## 2021-09-10 DIAGNOSIS — R19.7 DIARRHEA: ICD-10-CM

## 2021-09-10 LAB
BACTERIA #/AREA URNS HPF: ABNORMAL /HPF
BILIRUB UR QL STRIP: NEGATIVE
CLARITY UR: ABNORMAL
COLOR UR: YELLOW
GLUCOSE UR QL STRIP: NEGATIVE
HGB UR QL STRIP: NEGATIVE
HYALINE CASTS #/AREA URNS LPF: 52 /LPF
KETONES UR QL STRIP: ABNORMAL
LEUKOCYTE ESTERASE UR QL STRIP: ABNORMAL
MICROSCOPIC COMMENT: ABNORMAL
NITRITE UR QL STRIP: NEGATIVE
PH UR STRIP: 7 [PH] (ref 5–8)
PROT UR QL STRIP: ABNORMAL
RBC #/AREA URNS HPF: 3 /HPF (ref 0–4)
SARS-COV-2 RDRP RESP QL NAA+PROBE: NEGATIVE
SP GR UR STRIP: 1.03 (ref 1–1.03)
SQUAMOUS #/AREA URNS HPF: 8 /HPF
URN SPEC COLLECT METH UR: ABNORMAL
UROBILINOGEN UR STRIP-ACNC: ABNORMAL EU/DL
WBC #/AREA URNS HPF: 32 /HPF (ref 0–5)

## 2021-09-10 PROCEDURE — U0002 COVID-19 LAB TEST NON-CDC: HCPCS | Performed by: OBSTETRICS & GYNECOLOGY

## 2021-09-10 PROCEDURE — 87086 URINE CULTURE/COLONY COUNT: CPT | Performed by: OBSTETRICS & GYNECOLOGY

## 2021-09-10 PROCEDURE — 99211 OFF/OP EST MAY X REQ PHY/QHP: CPT

## 2021-09-10 PROCEDURE — 81001 URINALYSIS AUTO W/SCOPE: CPT | Performed by: OBSTETRICS & GYNECOLOGY

## 2021-09-10 PROCEDURE — 63600175 PHARM REV CODE 636 W HCPCS: Performed by: OBSTETRICS & GYNECOLOGY

## 2021-09-10 RX ORDER — SODIUM CHLORIDE, SODIUM LACTATE, POTASSIUM CHLORIDE, CALCIUM CHLORIDE 600; 310; 30; 20 MG/100ML; MG/100ML; MG/100ML; MG/100ML
INJECTION, SOLUTION INTRAVENOUS CONTINUOUS
Status: DISCONTINUED | OUTPATIENT
Start: 2021-09-10 | End: 2021-09-10 | Stop reason: HOSPADM

## 2021-09-10 RX ADMIN — SODIUM CHLORIDE, SODIUM LACTATE, POTASSIUM CHLORIDE, AND CALCIUM CHLORIDE: .6; .31; .03; .02 INJECTION, SOLUTION INTRAVENOUS at 11:09

## 2021-09-12 LAB — BACTERIA UR CULT: NO GROWTH

## 2021-10-15 LAB — PRENATAL STREP B CULTURE: NEGATIVE

## 2021-10-21 PROBLEM — N93.9 VAGINAL BLEEDING: Status: ACTIVE | Noted: 2021-10-21

## 2021-10-22 ENCOUNTER — ANESTHESIA EVENT (OUTPATIENT)
Dept: OBSTETRICS AND GYNECOLOGY | Facility: HOSPITAL | Age: 26
End: 2021-10-22
Payer: MEDICAID

## 2021-10-22 ENCOUNTER — HOSPITAL ENCOUNTER (INPATIENT)
Facility: HOSPITAL | Age: 26
LOS: 2 days | Discharge: HOME | End: 2021-10-24
Attending: OBSTETRICS & GYNECOLOGY | Admitting: SPECIALIST
Payer: MEDICAID

## 2021-10-22 ENCOUNTER — ANESTHESIA (OUTPATIENT)
Dept: OBSTETRICS AND GYNECOLOGY | Facility: HOSPITAL | Age: 26
End: 2021-10-22
Payer: MEDICAID

## 2021-10-22 DIAGNOSIS — R19.7 DIARRHEA, UNSPECIFIED TYPE: ICD-10-CM

## 2021-10-22 DIAGNOSIS — O35.EXX0 ENCOUNTER FOR REPEAT ULTRASOUND OF FETAL PYELECTASIS IN SINGLETON PREGNANCY, ANTEPARTUM: ICD-10-CM

## 2021-10-22 DIAGNOSIS — O35.BXX0 ANOMALY OF HEART OF FETUS AFFECTING PREGNANCY, ANTEPARTUM, SINGLE OR UNSPECIFIED FETUS: ICD-10-CM

## 2021-10-22 DIAGNOSIS — O09.899 SINGLE UMBILICAL ARTERY, MATERNAL, ANTEPARTUM: ICD-10-CM

## 2021-10-22 DIAGNOSIS — N93.9 VAGINAL BLEEDING: ICD-10-CM

## 2021-10-22 DIAGNOSIS — Z37.9 NORMAL LABOR: ICD-10-CM

## 2021-10-22 LAB
ABO + RH BLD: NORMAL
AMPHET+METHAMPHET UR QL: NEGATIVE
BACTERIA #/AREA URNS HPF: ABNORMAL /HPF
BARBITURATES UR QL SCN>200 NG/ML: NEGATIVE
BASOPHILS # BLD AUTO: 0.03 K/UL (ref 0–0.2)
BASOPHILS NFR BLD: 0.3 % (ref 0–1.9)
BENZODIAZ UR QL SCN>200 NG/ML: NEGATIVE
BILIRUB UR QL STRIP: NEGATIVE
BLD GP AB SCN CELLS X3 SERPL QL: NORMAL
BUPRENORPHINE UR QL: NEGATIVE
BZE UR QL SCN: NEGATIVE
CANNABINOIDS UR QL SCN: NEGATIVE
CLARITY UR: ABNORMAL
COLOR UR: YELLOW
CREAT UR-MCNC: 54 MG/DL (ref 15–325)
DIFFERENTIAL METHOD: ABNORMAL
EOSINOPHIL # BLD AUTO: 0.3 K/UL (ref 0–0.5)
EOSINOPHIL NFR BLD: 2.8 % (ref 0–8)
ERYTHROCYTE [DISTWIDTH] IN BLOOD BY AUTOMATED COUNT: 12.7 % (ref 11.5–14.5)
GLUCOSE UR QL STRIP: NEGATIVE
HCT VFR BLD AUTO: 34.8 % (ref 37–48.5)
HGB BLD-MCNC: 12 G/DL (ref 12–16)
HGB UR QL STRIP: ABNORMAL
HYALINE CASTS #/AREA URNS LPF: 10 /LPF
IMM GRANULOCYTES # BLD AUTO: 0.03 K/UL (ref 0–0.04)
IMM GRANULOCYTES NFR BLD AUTO: 0.3 % (ref 0–0.5)
KETONES UR QL STRIP: NEGATIVE
LEUKOCYTE ESTERASE UR QL STRIP: ABNORMAL
LYMPHOCYTES # BLD AUTO: 1.9 K/UL (ref 1–4.8)
LYMPHOCYTES NFR BLD: 16.3 % (ref 18–48)
MCH RBC QN AUTO: 29.1 PG (ref 27–31)
MCHC RBC AUTO-ENTMCNC: 34.5 G/DL (ref 32–36)
MCV RBC AUTO: 85 FL (ref 82–98)
MICROSCOPIC COMMENT: ABNORMAL
MONOCYTES # BLD AUTO: 0.5 K/UL (ref 0.3–1)
MONOCYTES NFR BLD: 4.7 % (ref 4–15)
NEUTROPHILS # BLD AUTO: 8.7 K/UL (ref 1.8–7.7)
NEUTROPHILS NFR BLD: 75.6 % (ref 38–73)
NITRITE UR QL STRIP: NEGATIVE
NRBC BLD-RTO: 0 /100 WBC
OPIATES UR QL SCN: NEGATIVE
PCP UR QL SCN>25 NG/ML: NEGATIVE
PH UR STRIP: 7 [PH] (ref 5–8)
PLATELET # BLD AUTO: 206 K/UL (ref 150–450)
PMV BLD AUTO: 11.1 FL (ref 9.2–12.9)
PROT UR QL STRIP: ABNORMAL
RBC # BLD AUTO: 4.12 M/UL (ref 4–5.4)
RBC #/AREA URNS HPF: 1 /HPF (ref 0–4)
RPR SER QL: NORMAL
SARS-COV-2 RDRP RESP QL NAA+PROBE: NEGATIVE
SP GR UR STRIP: 1.01 (ref 1–1.03)
SQUAMOUS #/AREA URNS HPF: 5 /HPF
TOXICOLOGY INFORMATION: NORMAL
URN SPEC COLLECT METH UR: ABNORMAL
UROBILINOGEN UR STRIP-ACNC: NEGATIVE EU/DL
WBC # BLD AUTO: 11.48 K/UL (ref 3.9–12.7)
WBC #/AREA URNS HPF: 56 /HPF (ref 0–5)

## 2021-10-22 PROCEDURE — 81001 URINALYSIS AUTO W/SCOPE: CPT | Performed by: SPECIALIST

## 2021-10-22 PROCEDURE — 80307 DRUG TEST PRSMV CHEM ANLYZR: CPT | Performed by: SPECIALIST

## 2021-10-22 PROCEDURE — 72200004 HC VAGINAL DELIVERY LEVEL I

## 2021-10-22 PROCEDURE — 12000002 HC ACUTE/MED SURGE SEMI-PRIVATE ROOM

## 2021-10-22 PROCEDURE — 25000003 PHARM REV CODE 250: Performed by: SPECIALIST

## 2021-10-22 PROCEDURE — 27200710 HC EPIDURAL INFUSION PUMP SET: Performed by: ANESTHESIOLOGY

## 2021-10-22 PROCEDURE — 85025 COMPLETE CBC W/AUTO DIFF WBC: CPT | Performed by: SPECIALIST

## 2021-10-22 PROCEDURE — 25000003 PHARM REV CODE 250: Performed by: ANESTHESIOLOGY

## 2021-10-22 PROCEDURE — 63600175 PHARM REV CODE 636 W HCPCS: Performed by: ANESTHESIOLOGY

## 2021-10-22 PROCEDURE — 86900 BLOOD TYPING SEROLOGIC ABO: CPT | Performed by: SPECIALIST

## 2021-10-22 PROCEDURE — 25000003 PHARM REV CODE 250: Performed by: OBSTETRICS & GYNECOLOGY

## 2021-10-22 PROCEDURE — 63600175 PHARM REV CODE 636 W HCPCS: Performed by: SPECIALIST

## 2021-10-22 PROCEDURE — 86592 SYPHILIS TEST NON-TREP QUAL: CPT | Performed by: SPECIALIST

## 2021-10-22 PROCEDURE — C1751 CATH, INF, PER/CENT/MIDLINE: HCPCS | Performed by: ANESTHESIOLOGY

## 2021-10-22 PROCEDURE — 87086 URINE CULTURE/COLONY COUNT: CPT | Performed by: SPECIALIST

## 2021-10-22 PROCEDURE — U0002 COVID-19 LAB TEST NON-CDC: HCPCS | Performed by: SPECIALIST

## 2021-10-22 PROCEDURE — 62326 NJX INTERLAMINAR LMBR/SAC: CPT | Performed by: ANESTHESIOLOGY

## 2021-10-22 RX ORDER — DIPHENHYDRAMINE HYDROCHLORIDE 50 MG/ML
25 INJECTION INTRAMUSCULAR; INTRAVENOUS EVERY 4 HOURS PRN
Status: DISCONTINUED | OUTPATIENT
Start: 2021-10-22 | End: 2021-10-24 | Stop reason: HOSPADM

## 2021-10-22 RX ORDER — DIPHENHYDRAMINE HYDROCHLORIDE 50 MG/ML
12.5 INJECTION INTRAMUSCULAR; INTRAVENOUS EVERY 4 HOURS PRN
Status: DISCONTINUED | OUTPATIENT
Start: 2021-10-22 | End: 2021-10-22

## 2021-10-22 RX ORDER — OXYCODONE AND ACETAMINOPHEN 10; 325 MG/1; MG/1
1 TABLET ORAL EVERY 4 HOURS PRN
Status: DISCONTINUED | OUTPATIENT
Start: 2021-10-22 | End: 2021-10-24 | Stop reason: HOSPADM

## 2021-10-22 RX ORDER — DIPHENHYDRAMINE HCL 25 MG
25 CAPSULE ORAL EVERY 4 HOURS PRN
Status: DISCONTINUED | OUTPATIENT
Start: 2021-10-22 | End: 2021-10-24 | Stop reason: HOSPADM

## 2021-10-22 RX ORDER — ONDANSETRON 2 MG/ML
4 INJECTION INTRAMUSCULAR; INTRAVENOUS EVERY 6 HOURS PRN
Status: DISCONTINUED | OUTPATIENT
Start: 2021-10-22 | End: 2021-10-22

## 2021-10-22 RX ORDER — ROPIVACAINE HYDROCHLORIDE 2 MG/ML
INJECTION, SOLUTION EPIDURAL; INFILTRATION
Status: DISCONTINUED | OUTPATIENT
Start: 2021-10-22 | End: 2021-10-22

## 2021-10-22 RX ORDER — OXYCODONE AND ACETAMINOPHEN 5; 325 MG/1; MG/1
1 TABLET ORAL EVERY 4 HOURS PRN
Status: DISCONTINUED | OUTPATIENT
Start: 2021-10-22 | End: 2021-10-24 | Stop reason: HOSPADM

## 2021-10-22 RX ORDER — DOCUSATE SODIUM 100 MG/1
200 CAPSULE, LIQUID FILLED ORAL 2 TIMES DAILY PRN
Status: DISCONTINUED | OUTPATIENT
Start: 2021-10-22 | End: 2021-10-24 | Stop reason: HOSPADM

## 2021-10-22 RX ORDER — CALCIUM CARBONATE 200(500)MG
500 TABLET,CHEWABLE ORAL 3 TIMES DAILY PRN
Status: DISCONTINUED | OUTPATIENT
Start: 2021-10-22 | End: 2021-10-22

## 2021-10-22 RX ORDER — OXYTOCIN-SODIUM CHLORIDE 0.9% IV SOLN 30 UNIT/500ML 30-0.9/5 UT/ML-%
30 SOLUTION INTRAVENOUS ONCE
Status: DISCONTINUED | OUTPATIENT
Start: 2021-10-22 | End: 2021-10-22

## 2021-10-22 RX ORDER — FENTANYL/BUPIVACAINE/NS/PF 2MCG/ML-.1
14 PLASTIC BAG, INJECTION (ML) INJECTION CONTINUOUS
Status: DISCONTINUED | OUTPATIENT
Start: 2021-10-22 | End: 2021-10-22

## 2021-10-22 RX ORDER — HYDROCORTISONE 25 MG/G
CREAM TOPICAL 3 TIMES DAILY PRN
Status: DISCONTINUED | OUTPATIENT
Start: 2021-10-22 | End: 2021-10-24 | Stop reason: HOSPADM

## 2021-10-22 RX ORDER — SIMETHICONE 80 MG
1 TABLET,CHEWABLE ORAL 3 TIMES DAILY PRN
Status: DISCONTINUED | OUTPATIENT
Start: 2021-10-22 | End: 2021-10-24 | Stop reason: HOSPADM

## 2021-10-22 RX ORDER — PROMETHAZINE HYDROCHLORIDE 25 MG/1
25 TABLET ORAL EVERY 6 HOURS PRN
Status: DISCONTINUED | OUTPATIENT
Start: 2021-10-22 | End: 2021-10-24 | Stop reason: HOSPADM

## 2021-10-22 RX ORDER — ONDANSETRON 4 MG/1
8 TABLET, ORALLY DISINTEGRATING ORAL EVERY 8 HOURS PRN
Status: DISCONTINUED | OUTPATIENT
Start: 2021-10-22 | End: 2021-10-24 | Stop reason: HOSPADM

## 2021-10-22 RX ORDER — SODIUM CHLORIDE, SODIUM LACTATE, POTASSIUM CHLORIDE, CALCIUM CHLORIDE 600; 310; 30; 20 MG/100ML; MG/100ML; MG/100ML; MG/100ML
INJECTION, SOLUTION INTRAVENOUS CONTINUOUS
Status: DISCONTINUED | OUTPATIENT
Start: 2021-10-22 | End: 2021-10-22

## 2021-10-22 RX ORDER — OXYTOCIN-SODIUM CHLORIDE 0.9% IV SOLN 30 UNIT/500ML 30-0.9/5 UT/ML-%
0-30 SOLUTION INTRAVENOUS CONTINUOUS
Status: DISCONTINUED | OUTPATIENT
Start: 2021-10-22 | End: 2021-10-22

## 2021-10-22 RX ORDER — EPHEDRINE SULFATE 50 MG/ML
10 INJECTION, SOLUTION INTRAVENOUS ONCE AS NEEDED
Status: DISCONTINUED | OUTPATIENT
Start: 2021-10-22 | End: 2021-10-22

## 2021-10-22 RX ORDER — NALOXONE HCL 0.4 MG/ML
0.4 VIAL (ML) INJECTION SEE ADMIN INSTRUCTIONS
Status: DISCONTINUED | OUTPATIENT
Start: 2021-10-22 | End: 2021-10-22

## 2021-10-22 RX ADMIN — Medication 14 ML/HR: at 02:10

## 2021-10-22 RX ADMIN — SODIUM CHLORIDE, SODIUM LACTATE, POTASSIUM CHLORIDE, AND CALCIUM CHLORIDE: .6; .31; .03; .02 INJECTION, SOLUTION INTRAVENOUS at 02:10

## 2021-10-22 RX ADMIN — ROPIVACAINE HYDROCHLORIDE 4 MG: 2 INJECTION, SOLUTION EPIDURAL; INFILTRATION at 02:10

## 2021-10-22 RX ADMIN — DOCUSATE SODIUM 200 MG: 100 CAPSULE ORAL at 09:10

## 2021-10-22 RX ADMIN — Medication 30 MILLI-UNITS/MIN: at 03:10

## 2021-10-22 RX ADMIN — Medication: at 11:10

## 2021-10-22 RX ADMIN — IBUPROFEN 600 MG: 400 TABLET, FILM COATED ORAL at 10:10

## 2021-10-22 RX ADMIN — DOCUSATE SODIUM 200 MG: 100 CAPSULE ORAL at 08:10

## 2021-10-22 RX ADMIN — SIMETHICONE 80 MG: 80 TABLET, CHEWABLE ORAL at 08:10

## 2021-10-22 RX ADMIN — IBUPROFEN 600 MG: 400 TABLET, FILM COATED ORAL at 03:10

## 2021-10-22 RX ADMIN — IBUPROFEN 600 MG: 400 TABLET, FILM COATED ORAL at 06:10

## 2021-10-22 RX ADMIN — SODIUM CHLORIDE, SODIUM LACTATE, POTASSIUM CHLORIDE, AND CALCIUM CHLORIDE 1000 ML: .6; .31; .03; .02 INJECTION, SOLUTION INTRAVENOUS at 02:10

## 2021-10-23 LAB
BACTERIA UR CULT: NORMAL
BACTERIA UR CULT: NORMAL
BASOPHILS # BLD AUTO: 0.06 K/UL (ref 0–0.2)
BASOPHILS NFR BLD: 0.5 % (ref 0–1.9)
DIFFERENTIAL METHOD: ABNORMAL
EOSINOPHIL # BLD AUTO: 0.3 K/UL (ref 0–0.5)
EOSINOPHIL NFR BLD: 2.9 % (ref 0–8)
ERYTHROCYTE [DISTWIDTH] IN BLOOD BY AUTOMATED COUNT: 13 % (ref 11.5–14.5)
HCT VFR BLD AUTO: 32 % (ref 37–48.5)
HGB BLD-MCNC: 10.9 G/DL (ref 12–16)
IMM GRANULOCYTES # BLD AUTO: 0.04 K/UL (ref 0–0.04)
IMM GRANULOCYTES NFR BLD AUTO: 0.4 % (ref 0–0.5)
LYMPHOCYTES # BLD AUTO: 2.7 K/UL (ref 1–4.8)
LYMPHOCYTES NFR BLD: 23.8 % (ref 18–48)
MCH RBC QN AUTO: 29.1 PG (ref 27–31)
MCHC RBC AUTO-ENTMCNC: 34.1 G/DL (ref 32–36)
MCV RBC AUTO: 85 FL (ref 82–98)
MONOCYTES # BLD AUTO: 0.6 K/UL (ref 0.3–1)
MONOCYTES NFR BLD: 5 % (ref 4–15)
NEUTROPHILS # BLD AUTO: 7.6 K/UL (ref 1.8–7.7)
NEUTROPHILS NFR BLD: 67.4 % (ref 38–73)
NRBC BLD-RTO: 0 /100 WBC
PLATELET # BLD AUTO: 188 K/UL (ref 150–450)
PMV BLD AUTO: 10.8 FL (ref 9.2–12.9)
RBC # BLD AUTO: 3.75 M/UL (ref 4–5.4)
WBC # BLD AUTO: 11.25 K/UL (ref 3.9–12.7)

## 2021-10-23 PROCEDURE — 36415 COLL VENOUS BLD VENIPUNCTURE: CPT | Performed by: SPECIALIST

## 2021-10-23 PROCEDURE — 12000002 HC ACUTE/MED SURGE SEMI-PRIVATE ROOM

## 2021-10-23 PROCEDURE — 85025 COMPLETE CBC W/AUTO DIFF WBC: CPT | Performed by: SPECIALIST

## 2021-10-23 PROCEDURE — 25000003 PHARM REV CODE 250: Performed by: OBSTETRICS & GYNECOLOGY

## 2021-10-23 PROCEDURE — 25000003 PHARM REV CODE 250: Performed by: SPECIALIST

## 2021-10-23 RX ADMIN — DOCUSATE SODIUM 200 MG: 100 CAPSULE ORAL at 01:10

## 2021-10-23 RX ADMIN — DOCUSATE SODIUM 200 MG: 100 CAPSULE ORAL at 08:10

## 2021-10-23 RX ADMIN — IBUPROFEN 600 MG: 400 TABLET, FILM COATED ORAL at 01:10

## 2021-10-23 RX ADMIN — IBUPROFEN 600 MG: 400 TABLET, FILM COATED ORAL at 08:10

## 2021-10-23 RX ADMIN — IBUPROFEN 600 MG: 400 TABLET, FILM COATED ORAL at 05:10

## 2021-10-23 RX ADMIN — SIMETHICONE 80 MG: 80 TABLET, CHEWABLE ORAL at 08:10

## 2021-10-24 VITALS
OXYGEN SATURATION: 97 % | RESPIRATION RATE: 18 BRPM | HEART RATE: 98 BPM | SYSTOLIC BLOOD PRESSURE: 118 MMHG | DIASTOLIC BLOOD PRESSURE: 76 MMHG | TEMPERATURE: 98 F

## 2021-10-24 PROCEDURE — 25000003 PHARM REV CODE 250: Performed by: SPECIALIST

## 2021-10-24 PROCEDURE — 63600175 PHARM REV CODE 636 W HCPCS: Performed by: SPECIALIST

## 2021-10-24 PROCEDURE — 90471 IMMUNIZATION ADMIN: CPT | Performed by: SPECIALIST

## 2021-10-24 PROCEDURE — 90715 TDAP VACCINE 7 YRS/> IM: CPT | Performed by: SPECIALIST

## 2021-10-24 RX ADMIN — DOCUSATE SODIUM 200 MG: 100 CAPSULE ORAL at 09:10

## 2021-10-24 RX ADMIN — IBUPROFEN 600 MG: 400 TABLET, FILM COATED ORAL at 01:10

## 2021-10-24 RX ADMIN — IBUPROFEN 600 MG: 400 TABLET, FILM COATED ORAL at 09:10

## 2021-10-24 RX ADMIN — CLOSTRIDIUM TETANI TOXOID ANTIGEN (FORMALDEHYDE INACTIVATED), CORYNEBACTERIUM DIPHTHERIAE TOXOID ANTIGEN (FORMALDEHYDE INACTIVATED), BORDETELLA PERTUSSIS TOXOID ANTIGEN (GLUTARALDEHYDE INACTIVATED), BORDETELLA PERTUSSIS FILAMENTOUS HEMAGGLUTININ ANTIGEN (FORMALDEHYDE INACTIVATED), BORDETELLA PERTUSSIS PERTACTIN ANTIGEN, AND BORDETELLA PERTUSSIS FIMBRIAE 2/3 ANTIGEN 0.5 ML: 5; 2; 2.5; 5; 3; 5 INJECTION, SUSPENSION INTRAMUSCULAR at 01:10

## 2022-01-24 DIAGNOSIS — Z20.822 CLOSE EXPOSURE TO COVID-19 VIRUS: Primary | ICD-10-CM

## 2022-01-24 PROBLEM — Z37.9 NORMAL LABOR: Status: RESOLVED | Noted: 2021-10-22 | Resolved: 2022-01-24

## 2023-01-08 ENCOUNTER — HOSPITAL ENCOUNTER (INPATIENT)
Facility: HOSPITAL | Age: 28
LOS: 3 days | Discharge: HOME | DRG: 093 | End: 2023-01-11
Attending: EMERGENCY MEDICINE | Admitting: INTERNAL MEDICINE
Payer: MEDICAID

## 2023-01-08 DIAGNOSIS — R53.1 WEAKNESS: ICD-10-CM

## 2023-01-08 DIAGNOSIS — G08 DURAL VENOUS SINUS THROMBOSIS: Primary | ICD-10-CM

## 2023-01-08 DIAGNOSIS — R20.0 LEFT ARM NUMBNESS: ICD-10-CM

## 2023-01-08 PROBLEM — R51.9 HEADACHE: Status: ACTIVE | Noted: 2023-01-08

## 2023-01-08 LAB
ALBUMIN SERPL BCP-MCNC: 4.2 G/DL (ref 3.5–5.2)
ALP SERPL-CCNC: 49 U/L (ref 55–135)
ALT SERPL W/O P-5'-P-CCNC: 9 U/L (ref 10–44)
ANION GAP SERPL CALC-SCNC: 9 MMOL/L (ref 8–16)
AST SERPL-CCNC: 14 U/L (ref 10–40)
B-HCG UR QL: NEGATIVE
BASOPHILS # BLD AUTO: 0.06 K/UL (ref 0–0.2)
BASOPHILS NFR BLD: 0.8 % (ref 0–1.9)
BILIRUB SERPL-MCNC: 0.6 MG/DL (ref 0.1–1)
BNP SERPL-MCNC: 3 PG/ML (ref 0–99)
BUN SERPL-MCNC: 10 MG/DL (ref 6–20)
CALCIUM SERPL-MCNC: 9.2 MG/DL (ref 8.7–10.5)
CHLORIDE SERPL-SCNC: 104 MMOL/L (ref 95–110)
CHOLEST SERPL-MCNC: 151 MG/DL (ref 120–199)
CHOLEST/HDLC SERPL: 2.2 {RATIO} (ref 2–5)
CO2 SERPL-SCNC: 25 MMOL/L (ref 23–29)
CREAT SERPL-MCNC: 0.4 MG/DL (ref 0.5–1.4)
CREAT SERPL-MCNC: 0.5 MG/DL (ref 0.5–1.4)
CTP QC/QA: YES
DIFFERENTIAL METHOD: NORMAL
EOSINOPHIL # BLD AUTO: 0.3 K/UL (ref 0–0.5)
EOSINOPHIL NFR BLD: 4.2 % (ref 0–8)
ERYTHROCYTE [DISTWIDTH] IN BLOOD BY AUTOMATED COUNT: 11.9 % (ref 11.5–14.5)
EST. GFR  (NO RACE VARIABLE): >60 ML/MIN/1.73 M^2
GLUCOSE SERPL-MCNC: 92 MG/DL (ref 70–110)
GLUCOSE SERPL-MCNC: 93 MG/DL (ref 70–110)
HCT VFR BLD AUTO: 44.2 % (ref 37–48.5)
HDLC SERPL-MCNC: 69 MG/DL (ref 40–75)
HDLC SERPL: 45.7 % (ref 20–50)
HGB BLD-MCNC: 14.9 G/DL (ref 12–16)
IMM GRANULOCYTES # BLD AUTO: 0.02 K/UL (ref 0–0.04)
IMM GRANULOCYTES NFR BLD AUTO: 0.3 % (ref 0–0.5)
LDLC SERPL CALC-MCNC: 65.8 MG/DL (ref 63–159)
LYMPHOCYTES # BLD AUTO: 2.2 K/UL (ref 1–4.8)
LYMPHOCYTES NFR BLD: 29.2 % (ref 18–48)
MCH RBC QN AUTO: 30.2 PG (ref 27–31)
MCHC RBC AUTO-ENTMCNC: 33.7 G/DL (ref 32–36)
MCV RBC AUTO: 90 FL (ref 82–98)
MONOCYTES # BLD AUTO: 0.4 K/UL (ref 0.3–1)
MONOCYTES NFR BLD: 5.1 % (ref 4–15)
NEUTROPHILS # BLD AUTO: 4.5 K/UL (ref 1.8–7.7)
NEUTROPHILS NFR BLD: 60.4 % (ref 38–73)
NONHDLC SERPL-MCNC: 82 MG/DL
NRBC BLD-RTO: 0 /100 WBC
PLATELET # BLD AUTO: 304 K/UL (ref 150–450)
PMV BLD AUTO: 9.2 FL (ref 9.2–12.9)
POTASSIUM SERPL-SCNC: 4.1 MMOL/L (ref 3.5–5.1)
PROT SERPL-MCNC: 7.6 G/DL (ref 6–8.4)
RBC # BLD AUTO: 4.94 M/UL (ref 4–5.4)
SAMPLE: NORMAL
SODIUM SERPL-SCNC: 138 MMOL/L (ref 136–145)
TRIGL SERPL-MCNC: 81 MG/DL (ref 30–150)
TROPONIN I SERPL HS-MCNC: <2.3 PG/ML (ref 0–14.9)
TSH SERPL DL<=0.005 MIU/L-ACNC: 1.05 UIU/ML (ref 0.34–5.6)
WBC # BLD AUTO: 7.4 K/UL (ref 3.9–12.7)

## 2023-01-08 PROCEDURE — 93010 EKG 12-LEAD: ICD-10-PCS | Mod: ,,, | Performed by: INTERNAL MEDICINE

## 2023-01-08 PROCEDURE — G0378 HOSPITAL OBSERVATION PER HR: HCPCS

## 2023-01-08 PROCEDURE — 96372 THER/PROPH/DIAG INJ SC/IM: CPT | Performed by: INTERNAL MEDICINE

## 2023-01-08 PROCEDURE — 84443 ASSAY THYROID STIM HORMONE: CPT | Performed by: EMERGENCY MEDICINE

## 2023-01-08 PROCEDURE — 21400001 HC TELEMETRY ROOM

## 2023-01-08 PROCEDURE — 93010 ELECTROCARDIOGRAM REPORT: CPT | Mod: ,,, | Performed by: INTERNAL MEDICINE

## 2023-01-08 PROCEDURE — 85025 COMPLETE CBC W/AUTO DIFF WBC: CPT | Performed by: EMERGENCY MEDICINE

## 2023-01-08 PROCEDURE — 25500020 PHARM REV CODE 255: Performed by: EMERGENCY MEDICINE

## 2023-01-08 PROCEDURE — 80061 LIPID PANEL: CPT | Performed by: EMERGENCY MEDICINE

## 2023-01-08 PROCEDURE — 82962 GLUCOSE BLOOD TEST: CPT

## 2023-01-08 PROCEDURE — 85610 PROTHROMBIN TIME: CPT | Performed by: EMERGENCY MEDICINE

## 2023-01-08 PROCEDURE — 81025 URINE PREGNANCY TEST: CPT | Performed by: EMERGENCY MEDICINE

## 2023-01-08 PROCEDURE — 63600175 PHARM REV CODE 636 W HCPCS: Performed by: INTERNAL MEDICINE

## 2023-01-08 PROCEDURE — 83880 ASSAY OF NATRIURETIC PEPTIDE: CPT | Performed by: EMERGENCY MEDICINE

## 2023-01-08 PROCEDURE — 99285 EMERGENCY DEPT VISIT HI MDM: CPT | Mod: 25

## 2023-01-08 PROCEDURE — 93005 ELECTROCARDIOGRAM TRACING: CPT | Performed by: INTERNAL MEDICINE

## 2023-01-08 PROCEDURE — 80053 COMPREHEN METABOLIC PANEL: CPT | Performed by: EMERGENCY MEDICINE

## 2023-01-08 PROCEDURE — 84484 ASSAY OF TROPONIN QUANT: CPT | Performed by: EMERGENCY MEDICINE

## 2023-01-08 RX ORDER — ETONOGESTREL AND ETHINYL ESTRADIOL VAGINAL RING .015; .12 MG/D; MG/D
1 RING VAGINAL
Status: ON HOLD | COMMUNITY
Start: 2022-12-20 | End: 2023-01-11 | Stop reason: HOSPADM

## 2023-01-08 RX ORDER — SODIUM CHLORIDE 0.9 % (FLUSH) 0.9 %
10 SYRINGE (ML) INJECTION
Status: DISCONTINUED | OUTPATIENT
Start: 2023-01-08 | End: 2023-01-11 | Stop reason: HOSPADM

## 2023-01-08 RX ORDER — ATORVASTATIN CALCIUM 40 MG/1
40 TABLET, FILM COATED ORAL DAILY
Status: DISCONTINUED | OUTPATIENT
Start: 2023-01-09 | End: 2023-01-11 | Stop reason: HOSPADM

## 2023-01-08 RX ORDER — IPRATROPIUM BROMIDE 21 UG/1
1 SPRAY, METERED NASAL 2 TIMES DAILY
COMMUNITY
Start: 2022-12-31

## 2023-01-08 RX ORDER — ENOXAPARIN SODIUM 100 MG/ML
40 INJECTION SUBCUTANEOUS EVERY 24 HOURS
Status: DISCONTINUED | OUTPATIENT
Start: 2023-01-08 | End: 2023-01-09

## 2023-01-08 RX ORDER — IPRATROPIUM BROMIDE 21 UG/1
1 SPRAY, METERED NASAL 2 TIMES DAILY
Status: DISCONTINUED | OUTPATIENT
Start: 2023-01-08 | End: 2023-01-11 | Stop reason: HOSPADM

## 2023-01-08 RX ORDER — ASPIRIN 81 MG/1
81 TABLET ORAL DAILY
Status: DISCONTINUED | OUTPATIENT
Start: 2023-01-09 | End: 2023-01-11 | Stop reason: HOSPADM

## 2023-01-08 RX ORDER — AMOXICILLIN AND CLAVULANATE POTASSIUM 875; 125 MG/1; MG/1
1 TABLET, FILM COATED ORAL EVERY 12 HOURS
Status: ON HOLD | COMMUNITY
Start: 2022-12-31 | End: 2023-01-11 | Stop reason: HOSPADM

## 2023-01-08 RX ORDER — BROMPHENIRAMINE MALEATE, PSEUDOEPHEDRINE HYDROCHLORIDE, AND DEXTROMETHORPHAN HYDROBROMIDE 2; 30; 10 MG/5ML; MG/5ML; MG/5ML
10 SYRUP ORAL 4 TIMES DAILY PRN
COMMUNITY
Start: 2022-12-31

## 2023-01-08 RX ADMIN — IOHEXOL 100 ML: 350 INJECTION, SOLUTION INTRAVENOUS at 01:01

## 2023-01-08 RX ADMIN — ENOXAPARIN SODIUM 40 MG: 100 INJECTION SUBCUTANEOUS at 05:01

## 2023-01-08 NOTE — ASSESSMENT & PLAN NOTE
Left face tongue and arm X 3 with associated headache  The symptoms have recurred X 3 currently asymptomatic with no headache  MRI will be available in the AM  Echo with bubble study  Neurology consulted

## 2023-01-08 NOTE — ED PROVIDER NOTES
Encounter Date: 1/8/2023       History     Chief Complaint   Patient presents with    Headache     X 2 WEEKS    Neck Pain    Facial Pain     27-year-old female presents complaining of intermittent left arm heaviness and numbness patient reports this has been happening for a few minutes daily for the past 3 days patient describes a headache that occurs after these symptoms patient reports 2 episodes on Friday while episode on Saturday and 1 episode starting at approximately 10:30am a.m. today and resolving at approximately 11:00 a.m..  Patient reports this is associated with a feeling of heaviness in her left arm and loss of fine motor coordination patient reports that each time it involved her left arm patient reports that these episodes last about 20-30 minutes and then her arm returns to normal she also reports that there is a feeling of numbness to the left side of her face and the left side of her tongue patient reports that afterwards she notices a headache that develops.  Patient reports no current symptoms right now.  Patient has no significant past medical history patient has no other acute complaints at this time.      Review of patient's allergies indicates:   Allergen Reactions    Suprax [cefixime] Hives     Past Medical History:   Diagnosis Date    Herpes simplex virus (HSV) infection     HSV II     Past Surgical History:   Procedure Laterality Date    FINGER SURGERY Left 1995    TONSILLECTOMY, ADENOIDECTOMY, BILATERAL MYRINGOTOMY AND TUBES Bilateral 1996    WISDOM TOOTH EXTRACTION Bilateral 2010     Family History   Problem Relation Age of Onset    Hypertension Maternal Grandmother     Diabetes Maternal Grandmother     Kidney disease Maternal Grandmother     Arrhythmia Neg Hx     Cardiomyopathy Neg Hx     Congenital heart disease Neg Hx     Heart attacks under age 50 Neg Hx     Pacemaker/defibrilator Neg Hx      Social History     Tobacco Use    Smoking status: Never    Smokeless tobacco: Never    Substance Use Topics    Alcohol use: No    Drug use: No     Review of Systems   Constitutional:  Negative for fever.   HENT:  Negative for congestion, rhinorrhea, sore throat and trouble swallowing.    Eyes:  Negative for visual disturbance.   Respiratory:  Negative for cough, chest tightness, shortness of breath and wheezing.    Cardiovascular:  Negative for chest pain, palpitations and leg swelling.   Gastrointestinal:  Negative for abdominal distention, abdominal pain, constipation, diarrhea, nausea and vomiting.   Genitourinary:  Negative for difficulty urinating, dysuria, flank pain and frequency.   Musculoskeletal:  Negative for arthralgias, back pain, joint swelling and neck pain.   Skin:  Negative for color change and rash.   Neurological:  Positive for weakness, numbness and headaches. Negative for dizziness, syncope and speech difficulty.   All other systems reviewed and are negative.    Physical Exam     Initial Vitals [01/08/23 1209]   BP Pulse Resp Temp SpO2   (!) 151/90 94 18 98.5 °F (36.9 °C) 97 %      MAP       --         Physical Exam    Nursing note and vitals reviewed.  Constitutional: She appears well-developed and well-nourished. She is not diaphoretic. No distress.   HENT:   Head: Normocephalic and atraumatic.   Right Ear: External ear normal.   Left Ear: External ear normal.   Nose: Nose normal.   Mouth/Throat: Oropharynx is clear and moist. No oropharyngeal exudate.   Eyes: Conjunctivae and EOM are normal. Pupils are equal, round, and reactive to light. Right eye exhibits no discharge. Left eye exhibits no discharge. No scleral icterus.   Neck: Neck supple. No thyromegaly present. No tracheal deviation present. No JVD present.   Normal range of motion.  Cardiovascular:  Normal rate, regular rhythm, normal heart sounds and intact distal pulses.     Exam reveals no gallop and no friction rub.       No murmur heard.  Pulmonary/Chest: Breath sounds normal. No stridor. No respiratory distress.  She has no wheezes. She has no rhonchi. She has no rales. She exhibits no tenderness.   Abdominal: Abdomen is soft. Bowel sounds are normal. She exhibits no distension and no mass. There is no abdominal tenderness. There is no rebound and no guarding.   Musculoskeletal:         General: No tenderness or edema. Normal range of motion.      Cervical back: Normal range of motion and neck supple.     Lymphadenopathy:     She has no cervical adenopathy.   Neurological: She is alert and oriented to person, place, and time. She has normal strength. No cranial nerve deficit or sensory deficit.   No pronator drift, no dysmetria, no dyskinesia, gait stable, strength 5/5 x 4, no gross neurosensory deficits, CN II-XII grossly intact.    Skin: Skin is warm and dry. No rash and no abscess noted. No erythema. No pallor.       ED Course   Procedures  Labs Reviewed   COMPREHENSIVE METABOLIC PANEL - Abnormal; Notable for the following components:       Result Value    Creatinine 0.4 (*)     Alkaline Phosphatase 49 (*)     ALT 9 (*)     All other components within normal limits   CBC W/ AUTO DIFFERENTIAL   TSH   LIPID PANEL   TROPONIN I HIGH SENSITIVITY   B-TYPE NATRIURETIC PEPTIDE   PROTIME-INR   POCT URINE PREGNANCY   POCT GLUCOSE   ISTAT CREATININE   POCT GLUCOSE MONITORING CONTINUOUS   POCT CREATININE        ECG Results              ECG 12 lead (In process)  Result time 01/08/23 13:33:24      In process by Interface, Lab In [facility] (01/08/23 13:33:24)                   Narrative:    Test Reason : I63.9,    Vent. Rate : 069 BPM     Atrial Rate : 069 BPM     P-R Int : 104 ms          QRS Dur : 090 ms      QT Int : 386 ms       P-R-T Axes : 014 057 024 degrees     QTc Int : 413 ms    Sinus rhythm with short WA  Otherwise normal ECG  No previous ECGs available    Referred By: [email or web address]   SELF           Confirmed By:                                   Imaging Results              CTA Head and Neck (xpd) (Final result)  Result time  01/08/23 14:15:34      Final result by Alo Tony MD (01/08/23 14:15:34)                   Narrative:    CTA HEAD AND NECK    CLINICAL DATA: Transient ischemic attack    CMS MANDATED QUALITY DATA - CT RADIATION  436    All CT scans at this facility utilize dose modulation, iterative reconstruction, and/or weight based dosing when appropriate to reduce radiation dose to as low as reasonably achievable.    CMS MANDATED QUALITY DATA - CAROTID - 195    All measurements and percent stenosis described below were determined using NASCET criteria or criteria similar to NASCET, as defined by the Society of Radiologists in Ultrasound Consensus Conference, Radiology, 2003    CT angiography of the head and neck was performed. 100 mL nonionic contrast was administered. 3-D multiplanar reconstruction images were obtained and stored as part of the patient's permanent medical record.    CTA NECK:    The aortic arch and great vessel origins are within normal limits.    The bilateral common carotid and internal carotid arteries are normal in course and caliber, without significant plaque formation. Both vertebral arteries are patent with dominant left vertebral artery noted.      CTA BRAIN:    Intracranial portions of the internal carotid arteries are patent and within normal limits. The basilar artery is unremarkable.    The bilateral anterior, middle, and posterior cerebral arteries are within normal limits, with no evidence of major branch occlusion, high-grade stenosis, or aneurysm. Patent posterior communicating arteries are noted bilaterally. Anterior communicating artery is also visualized.    IMPRESSION:  1. Normal CTA HEAD AND NECK.    Electronically signed by:  Alo Tony MD  1/8/2023 2:15 PM CST Workstation: [id number]                                     CT Head Without Contrast (Final result)  Result time 01/08/23 14:12:39      Final result by Alo Tony MD (01/08/23 14:12:39)                    Narrative:    CT HEAD WITHOUT CONTRAST    CMS MANDATED QUALITY DATA - CT RADIATION  436    All CT scans at this facility utilize dose modulation, iterative reconstruction, and/or weight based dosing when appropriate to reduce radiation dose to as low as reasonably achievable    Clinical data: Neurological deficit, stroke suspected.    FINDINGS: Noninfusion images were obtained from the skull base to the vertex. There is no intracranial mass, hemorrhage, or midline shift. Ventricles and sulci are normal. There are no pathologic extra-axial fluid collections. There is no evidence of ischemic change or edema. Cerebellum and brainstem are normal.    The calvarium is intact.    IMPRESSION:    1. Normal CT head without contrast.      Electronically signed by:  Alo Tony MD  1/8/2023 2:12 PM CST Workstation: [id number]                                     X-Ray Chest AP Portable (Final result)  Result time 01/08/23 13:05:19      Final result by Alo Tony MD (01/08/23 13:05:19)                   Narrative:    Chest single view    Clinical data:Headache, neck pain.    FINDINGS: AP view of the chest demonstrates no cardiac, pulmonary, or osseous abnormalities.    IMPRESSION:  1. Normal chest single view.    Electronically signed by:  Alo Tony MD  1/8/2023 1:05 PM CST Workstation: [id number]                                     Medications   ipratropium 21 mcg (0.03 %) nasal spray 1 spray (has no administration in time range)   sodium chloride 0.9% flush 10 mL (has no administration in time range)   enoxaparin injection 40 mg (40 mg Subcutaneous Given 1/8/23 1715)   atorvastatin tablet 40 mg (has no administration in time range)   aspirin EC tablet 81 mg (has no administration in time range)   iohexoL (OMNIPAQUE 350) injection 100 mL (100 mLs Intravenous Given 1/8/23 1343)     Medical Decision Making:   History:   Old Medical Records: I decided to obtain old medical records.  Initial Assessment:    Emergent evaluation of a 27-year-old female presenting with headaches and left arm and face weakness which is intermittent differential diagnosis includes TIAs, atypical migraine, electrolyte abnormality, endocrine dysfunction, intracranial abnormality          Attending Attestation:             Attending ED Notes:   Patient consulted to Neurology they recommend admission they suggest patient most likely has hemiplegic migraines however they need to rule out MS and CVA, patient consulted Internal Medicine for admission.                 Clinical Impression:   Final diagnoses:  [R53.1] Weakness (Primary)        ED Disposition Condition    Observation                 Darvin Farrar MD  01/08/23 0935

## 2023-01-08 NOTE — H&P
[facility] - Emergency Dept  Hospital Medicine  History & Physical    Patient Name: Shereen Walker  MRN: [id number]  Patient Class: OP- Observation  Admission Date: 1/8/2023  Attending Physician: Jere Ritchie MD  Primary Care Provider: Aleida Yip MD         Patient information was obtained from patient, parent, past medical records and ER records.     Subjective:     Principal Problem:<principal problem not specified>    Chief Complaint:   Chief Complaint   Patient presents with    Headache     X 2 WEEKS    Neck Pain    Facial Pain        HPI: ED note  27-year-old female presents complaining of intermittent left arm heaviness and numbness patient reports this has been happening for a few minutes daily for the past 3 days patient describes a headache that occurs after these symptoms patient reports 2 episodes on Friday while episode on Saturday and 1 episode starting at approximately 10:30am a.m. today and resolving at approximately 11:00 a.m..  Patient reports this is associated with a feeling of heaviness in her left arm and loss of fine motor coordination patient reports that each time it involved her left arm patient reports that these episodes last about 20-30 minutes and then her arm returns to normal she also reports that there is a feeling of numbness to the left side of her face and the left side of her tongue patient reports that afterwards she notices a headache that develops.  Patient reports no current symptoms right now.  Patient has no significant past medical history patient has no other acute complaints at this time.      1/8/2022  Pt has no symptoms but has has 3 episodes of left sided numbness and a headache concurrently.           Past Medical History:   Diagnosis Date    Herpes simplex virus (HSV) infection     HSV II       Past Surgical History:   Procedure Laterality Date    FINGER SURGERY Left 1995    TONSILLECTOMY, ADENOIDECTOMY, BILATERAL MYRINGOTOMY AND TUBES  Bilateral 1996    WISDOM TOOTH EXTRACTION Bilateral 2010       Review of patient's allergies indicates:   Allergen Reactions    Suprax [cefixime] Hives       No current facility-administered medications on file prior to encounter.     Current Outpatient Medications on File Prior to Encounter   Medication Sig    amoxicillin-clavulanate 875-125mg (AUGMENTIN) 875-125 mg per tablet Take 1 tablet by mouth every 12 (twelve) hours.    etonogestreL-ethinyl estradioL (NUVARING) 0.12-0.015 mg/24 hr vaginal ring Place 1 each vaginally every 28 days.    ipratropium (ATROVENT) 21 mcg (0.03 %) nasal spray 1 spray by Each Nostril route 2 (two) times daily.    brompheniramine-pseudoeph-DM (BROMFED DM) 2-30-10 mg/5 mL Syrp Take 10 mLs by mouth 4 (four) times daily as needed.     Family History       Problem Relation (Age of Onset)    Diabetes Maternal Grandmother    Hypertension Maternal Grandmother    Kidney disease Maternal Grandmother          Tobacco Use    Smoking status: Never    Smokeless tobacco: Never   Substance and Sexual Activity    Alcohol use: No    Drug use: No    Sexual activity: Yes     Partners: Male     Birth control/protection: None     Review of Systems   Constitutional:  Positive for diaphoresis and fatigue.   HENT: Negative.     Eyes: Negative.    Respiratory: Negative.     Cardiovascular: Negative.    Gastrointestinal: Negative.    Endocrine: Negative.    Genitourinary: Negative.    Musculoskeletal: Negative.    Allergic/Immunologic: Negative.    Neurological: Negative.    Hematological: Negative.    Psychiatric/Behavioral:  The patient is nervous/anxious.    Objective:     Vital Signs (Most Recent):  Temp: 98.5 °F (36.9 °C) (01/08/23 1209)  Pulse: 69 (01/08/23 1430)  Resp: 20 (01/08/23 1430)  BP: 103/60 (01/08/23 1430)  SpO2: 99 % (01/08/23 1430) Vital Signs (24h Range):  Temp:  [98.5 °F (36.9 °C)] 98.5 °F (36.9 °C)  Pulse:  [69-94] 69  Resp:  [13-20] 20  SpO2:  [97 %-100 %] 99 %  BP:  (103-151)/(60-90) 103/60     Weight: 79.4 kg (175 lb)  Body mass index is 31 kg/m².    Physical Exam  Vitals and nursing note reviewed.   Constitutional:       General: She is not in acute distress.  HENT:      Head: Normocephalic and atraumatic.      Nose: Nose normal.      Mouth/Throat:      Mouth: Mucous membranes are moist.   Eyes:      Extraocular Movements: Extraocular movements intact.      Pupils: Pupils are equal, round, and reactive to light.   Cardiovascular:      Rate and Rhythm: Normal rate and regular rhythm.   Pulmonary:      Effort: Pulmonary effort is normal.      Breath sounds: Normal breath sounds.   Abdominal:      General: Bowel sounds are normal. There is no distension.      Tenderness: There is no abdominal tenderness. There is no guarding or rebound.   Musculoskeletal:         General: Normal range of motion.      Cervical back: Normal range of motion and neck supple.   Skin:     General: Skin is warm.   Neurological:      Mental Status: She is alert and oriented to person, place, and time.   Psychiatric:      Comments: anxious         CRANIAL NERVES     CN III, IV, VI   Pupils are equal, round, and reactive to light.     Significant Labs: All pertinent labs within the past 24 hours have been reviewed.  Recent Lab Results         01/08/23  1304   01/08/23  1259   01/08/23  1254   01/08/23  1228        Albumin     4.2         Alkaline Phosphatase     49         ALT     9         Anion Gap     9         AST     14         Baso #     0.06         Basophil %     0.8         BILIRUBIN TOTAL     0.6  Comment: For infants and newborns, interpretation of results should be based  on gestational age, weight and in agreement with clinical  observations.    Premature Infant recommended reference ranges:  Up to 24 hours.............<8.0 mg/dL  Up to 48 hours............<12.0 mg/dL  3-5 days..................<15.0 mg/dL  6-29 days.................<15.0 mg/dL           BNP     3  Comment: Values of less  than 100 pg/ml are consistent with non-CHF populations.         BUN     10         Calcium     9.2         Chloride     104         Cholesterol     151  Comment: The National Cholesterol Education Program (NCEP) has set the  following guidelines (reference ranges) for Cholesterol:  Optimal.....................<200 mg/dL  Borderline High.............200-239 mg/dL  High........................> or = 240 mg/dL           CO2     25         Creatinine     0.4         Differential Method     Automated         eGFR     >60.0         Eos #     0.3         Eosinophil %     4.2         Glucose     93         Gran # (ANC)     4.5         Gran %     60.4         HDL     69  Comment: The National Cholesterol Education Program (NCEP) has set the  following guidelines (reference values) for HDL Cholesterol:  Low...............<40 mg/dL  Optimal...........>60 mg/dL           HDL/Cholesterol Ratio     45.7         Hematocrit     44.2         Hemoglobin     14.9         Immature Grans (Abs)     0.02  Comment: Mild elevation in immature granulocytes is non specific and   can be seen in a variety of conditions including stress response,   acute inflammation, trauma and pregnancy. Correlation with other   laboratory and clinical findings is essential.           Immature Granulocytes     0.3         LDL Cholesterol External     65.8  Comment: The National Cholesterol Education Program (NCEP) has set the  following guidelines (reference values) for LDL Cholesterol:  Optimal.......................<130 mg/dL  Borderline High...............130-159 mg/dL  High..........................160-189 mg/dL  Very High.....................>190 mg/dL           Lymph #     2.2         Lymph %     29.2         MCH     30.2         MCHC     33.7         MCV     90         Mono #     0.4         Mono %     5.1         MPV     9.2         Non-HDL Cholesterol     82  Comment: Risk category and Non-HDL cholesterol goals:  Coronary heart disease (CHD)or  equivalent (10-year risk of CHD >20%):  Non-HDL cholesterol goal     <130 mg/dL  Two or more CHD risk factors and 10-year risk of CHD <= 20%:  Non-HDL cholesterol goal     <160 mg/dL  0 to 1 CHD risk factor:  Non-HDL cholesterol goal     <190 mg/dL           nRBC     0         Platelets     304         POC Creatinine   0.5           POC Glucose 92             Potassium     4.1         Preg Test, Ur       Negative       PROTEIN TOTAL     7.6          Acceptable       Yes       RBC     4.94         RDW     11.9         Sample   VENOUS           Sodium     138         Total Cholesterol/HDL Ratio     2.2         Triglycerides     81  Comment: The National Cholesterol Education Program (NCEP) has set the  following guidelines (reference values) for triglycerides:  Normal......................<150 mg/dL  Borderline High.............150-199 mg/dL  High........................200-499 mg/dL           Troponin I High Sensitivity     <2.3  Comment: Troponin results differ between methods. Do not use   results between Troponin methods interchangeably.    Alkaline Phospatase levels above 400 U/L may   cause false positive results.    Access hsTnI should not be used for patients taking   Asfotase abby (Strensiq).           TSH     1.050         WBC     7.40                 Significant Imaging: I have reviewed all pertinent imaging results/findings within the past 24 hours.    Assessment/Plan:     Headache  Most likely a complex migraine resolved at present      Numbness  Left face tongue and arm X 3 with associated headache  The symptoms have recurred X 3 currently asymptomatic with no headache  MRI will be available in the AM  Echo with bubble study  Neurology consulted      VTE Risk Mitigation (From admission, onward)    None             Jere Ritchie MD  Department of Hospital Medicine   [facility] - Emergency Dept

## 2023-01-08 NOTE — HPI
ED note  27-year-old female presents complaining of intermittent left arm heaviness and numbness patient reports this has been happening for a few minutes daily for the past 3 days patient describes a headache that occurs after these symptoms patient reports 2 episodes on Friday while episode on Saturday and 1 episode starting at approximately 10:30am a.m. today and resolving at approximately 11:00 a.m..  Patient reports this is associated with a feeling of heaviness in her left arm and loss of fine motor coordination patient reports that each time it involved her left arm patient reports that these episodes last about 20-30 minutes and then her arm returns to normal she also reports that there is a feeling of numbness to the left side of her face and the left side of her tongue patient reports that afterwards she notices a headache that develops.  Patient reports no current symptoms right now.  Patient has no significant past medical history patient has no other acute complaints at this time.      1/8/2022  Pt has no symptoms but has has 3 episodes of left sided numbness and a headache concurrently.

## 2023-01-08 NOTE — ED NOTES
Pt states that she went to urgent care [facility] because she had been having cold symptoms. Says they gave her a steroid shot and she felt better but after that she says shes been having numbness to right side of her body, ha, neck and jaw pain. Then Friday her left side goes numb intermittently. Currently rates pain 2/10 and pt states no numbness or ha

## 2023-01-08 NOTE — SUBJECTIVE & OBJECTIVE
Past Medical History:   Diagnosis Date    Herpes simplex virus (HSV) infection     HSV II       Past Surgical History:   Procedure Laterality Date    FINGER SURGERY Left 1995    TONSILLECTOMY, ADENOIDECTOMY, BILATERAL MYRINGOTOMY AND TUBES Bilateral 1996    WISDOM TOOTH EXTRACTION Bilateral 2010       Review of patient's allergies indicates:   Allergen Reactions    Suprax [cefixime] Hives       No current facility-administered medications on file prior to encounter.     Current Outpatient Medications on File Prior to Encounter   Medication Sig    amoxicillin-clavulanate 875-125mg (AUGMENTIN) 875-125 mg per tablet Take 1 tablet by mouth every 12 (twelve) hours.    etonogestreL-ethinyl estradioL (NUVARING) 0.12-0.015 mg/24 hr vaginal ring Place 1 each vaginally every 28 days.    ipratropium (ATROVENT) 21 mcg (0.03 %) nasal spray 1 spray by Each Nostril route 2 (two) times daily.    brompheniramine-pseudoeph-DM (BROMFED DM) 2-30-10 mg/5 mL Syrp Take 10 mLs by mouth 4 (four) times daily as needed.     Family History       Problem Relation (Age of Onset)    Diabetes Maternal Grandmother    Hypertension Maternal Grandmother    Kidney disease Maternal Grandmother          Tobacco Use    Smoking status: Never    Smokeless tobacco: Never   Substance and Sexual Activity    Alcohol use: No    Drug use: No    Sexual activity: Yes     Partners: Male     Birth control/protection: None     Review of Systems   Constitutional:  Positive for diaphoresis and fatigue.   HENT: Negative.     Eyes: Negative.    Respiratory: Negative.     Cardiovascular: Negative.    Gastrointestinal: Negative.    Endocrine: Negative.    Genitourinary: Negative.    Musculoskeletal: Negative.    Allergic/Immunologic: Negative.    Neurological: Negative.    Hematological: Negative.    Psychiatric/Behavioral:  The patient is nervous/anxious.    Objective:     Vital Signs (Most Recent):  Temp: 98.5 °F (36.9 °C) (01/08/23 1209)  Pulse: 69 (01/08/23 1430)  Resp:  20 (01/08/23 1430)  BP: 103/60 (01/08/23 1430)  SpO2: 99 % (01/08/23 1430) Vital Signs (24h Range):  Temp:  [98.5 °F (36.9 °C)] 98.5 °F (36.9 °C)  Pulse:  [69-94] 69  Resp:  [13-20] 20  SpO2:  [97 %-100 %] 99 %  BP: (103-151)/(60-90) 103/60     Weight: 79.4 kg (175 lb)  Body mass index is 31 kg/m².    Physical Exam  Vitals and nursing note reviewed.   Constitutional:       General: She is not in acute distress.  HENT:      Head: Normocephalic and atraumatic.      Nose: Nose normal.      Mouth/Throat:      Mouth: Mucous membranes are moist.   Eyes:      Extraocular Movements: Extraocular movements intact.      Pupils: Pupils are equal, round, and reactive to light.   Cardiovascular:      Rate and Rhythm: Normal rate and regular rhythm.   Pulmonary:      Effort: Pulmonary effort is normal.      Breath sounds: Normal breath sounds.   Abdominal:      General: Bowel sounds are normal. There is no distension.      Tenderness: There is no abdominal tenderness. There is no guarding or rebound.   Musculoskeletal:         General: Normal range of motion.      Cervical back: Normal range of motion and neck supple.   Skin:     General: Skin is warm.   Neurological:      Mental Status: She is alert and oriented to person, place, and time.   Psychiatric:      Comments: anxious         CRANIAL NERVES     CN III, IV, VI   Pupils are equal, round, and reactive to light.     Significant Labs: All pertinent labs within the past 24 hours have been reviewed.  Recent Lab Results         01/08/23  1304   01/08/23  1259   01/08/23  1254   01/08/23  1228        Albumin     4.2         Alkaline Phosphatase     49         ALT     9         Anion Gap     9         AST     14         Baso #     0.06         Basophil %     0.8         BILIRUBIN TOTAL     0.6  Comment: For infants and newborns, interpretation of results should be based  on gestational age, weight and in agreement with clinical  observations.    Premature Infant recommended  reference ranges:  Up to 24 hours.............<8.0 mg/dL  Up to 48 hours............<12.0 mg/dL  3-5 days..................<15.0 mg/dL  6-29 days.................<15.0 mg/dL           BNP     3  Comment: Values of less than 100 pg/ml are consistent with non-CHF populations.         BUN     10         Calcium     9.2         Chloride     104         Cholesterol     151  Comment: The National Cholesterol Education Program (NCEP) has set the  following guidelines (reference ranges) for Cholesterol:  Optimal.....................<200 mg/dL  Borderline High.............200-239 mg/dL  High........................> or = 240 mg/dL           CO2     25         Creatinine     0.4         Differential Method     Automated         eGFR     >60.0         Eos #     0.3         Eosinophil %     4.2         Glucose     93         Gran # (ANC)     4.5         Gran %     60.4         HDL     69  Comment: The National Cholesterol Education Program (NCEP) has set the  following guidelines (reference values) for HDL Cholesterol:  Low...............<40 mg/dL  Optimal...........>60 mg/dL           HDL/Cholesterol Ratio     45.7         Hematocrit     44.2         Hemoglobin     14.9         Immature Grans (Abs)     0.02  Comment: Mild elevation in immature granulocytes is non specific and   can be seen in a variety of conditions including stress response,   acute inflammation, trauma and pregnancy. Correlation with other   laboratory and clinical findings is essential.           Immature Granulocytes     0.3         LDL Cholesterol External     65.8  Comment: The National Cholesterol Education Program (NCEP) has set the  following guidelines (reference values) for LDL Cholesterol:  Optimal.......................<130 mg/dL  Borderline High...............130-159 mg/dL  High..........................160-189 mg/dL  Very High.....................>190 mg/dL           Lymph #     2.2         Lymph %     29.2         MCH     30.2         MCHC      33.7         MCV     90         Mono #     0.4         Mono %     5.1         MPV     9.2         Non-HDL Cholesterol     82  Comment: Risk category and Non-HDL cholesterol goals:  Coronary heart disease (CHD)or equivalent (10-year risk of CHD >20%):  Non-HDL cholesterol goal     <130 mg/dL  Two or more CHD risk factors and 10-year risk of CHD <= 20%:  Non-HDL cholesterol goal     <160 mg/dL  0 to 1 CHD risk factor:  Non-HDL cholesterol goal     <190 mg/dL           nRBC     0         Platelets     304         POC Creatinine   0.5           POC Glucose 92             Potassium     4.1         Preg Test, Ur       Negative       PROTEIN TOTAL     7.6          Acceptable       Yes       RBC     4.94         RDW     11.9         Sample   VENOUS           Sodium     138         Total Cholesterol/HDL Ratio     2.2         Triglycerides     81  Comment: The National Cholesterol Education Program (NCEP) has set the  following guidelines (reference values) for triglycerides:  Normal......................<150 mg/dL  Borderline High.............150-199 mg/dL  High........................200-499 mg/dL           Troponin I High Sensitivity     <2.3  Comment: Troponin results differ between methods. Do not use   results between Troponin methods interchangeably.    Alkaline Phospatase levels above 400 U/L may   cause false positive results.    Access hsTnI should not be used for patients taking   Asfotase abby (Strensiq).           TSH     1.050         WBC     7.40                 Significant Imaging: I have reviewed all pertinent imaging results/findings within the past 24 hours.

## 2023-01-09 ENCOUNTER — CLINICAL SUPPORT (OUTPATIENT)
Dept: CARDIOLOGY | Facility: HOSPITAL | Age: 28
DRG: 093 | End: 2023-01-09
Attending: INTERNAL MEDICINE
Payer: MEDICAID

## 2023-01-09 VITALS — HEIGHT: 63 IN | BODY MASS INDEX: 31.18 KG/M2 | WEIGHT: 176 LBS

## 2023-01-09 PROBLEM — G08 DURAL VENOUS SINUS THROMBOSIS: Status: ACTIVE | Noted: 2023-01-09

## 2023-01-09 LAB
ALBUMIN SERPL BCP-MCNC: 3.7 G/DL (ref 3.5–5.2)
ALP SERPL-CCNC: 48 U/L (ref 55–135)
ALT SERPL W/O P-5'-P-CCNC: 8 U/L (ref 10–44)
ANION GAP SERPL CALC-SCNC: 15 MMOL/L (ref 8–16)
AORTIC VALVE CUSP SEPERATION: 2.18 CM
APTT BLDCRRT: 27.3 SEC (ref 21–32)
AST SERPL-CCNC: 12 U/L (ref 10–40)
AV INDEX (PROSTH): 0.86
AV MEAN GRADIENT: 3 MMHG
AV PEAK GRADIENT: 5 MMHG
AV VALVE AREA: 2.75 CM2
AV VELOCITY RATIO: 0.93
BASOPHILS # BLD AUTO: 0.06 K/UL (ref 0–0.2)
BASOPHILS NFR BLD: 0.8 % (ref 0–1.9)
BILIRUB SERPL-MCNC: 0.4 MG/DL (ref 0.1–1)
BILIRUB UR QL STRIP: NEGATIVE
BSA FOR ECHO PROCEDURE: 1.88 M2
BUN SERPL-MCNC: 7 MG/DL (ref 6–20)
CALCIUM SERPL-MCNC: 8.9 MG/DL (ref 8.7–10.5)
CHLORIDE SERPL-SCNC: 99 MMOL/L (ref 95–110)
CK MB SERPL-MCNC: 0.3 NG/ML (ref 0.1–6.5)
CLARITY UR: ABNORMAL
CO2 SERPL-SCNC: 23 MMOL/L (ref 23–29)
COLOR UR: YELLOW
CREAT SERPL-MCNC: 0.5 MG/DL (ref 0.5–1.4)
CV ECHO LV RWT: 0.42 CM
DIFFERENTIAL METHOD: ABNORMAL
DOP CALC AO PEAK VEL: 1.09 M/S
DOP CALC AO VTI: 19.8 CM
DOP CALC LVOT AREA: 3.2 CM2
DOP CALC LVOT DIAMETER: 2.02 CM
DOP CALC LVOT PEAK VEL: 1.01 M/S
DOP CALC LVOT STROKE VOLUME: 54.45 CM3
DOP CALCLVOT PEAK VEL VTI: 17 CM
E WAVE DECELERATION TIME: 194.31 MSEC
E/A RATIO: 1.55
E/E' RATIO: 9 M/S
ECHO LV POSTERIOR WALL: 0.8 CM (ref 0.6–1.1)
EJECTION FRACTION: 65 %
EOSINOPHIL # BLD AUTO: 0.4 K/UL (ref 0–0.5)
EOSINOPHIL NFR BLD: 4.9 % (ref 0–8)
ERYTHROCYTE [DISTWIDTH] IN BLOOD BY AUTOMATED COUNT: 11.9 % (ref 11.5–14.5)
EST. GFR  (NO RACE VARIABLE): >60 ML/MIN/1.73 M^2
ESTIMATED AVG GLUCOSE: 105 MG/DL (ref 68–131)
FRACTIONAL SHORTENING: 31 % (ref 28–44)
GLUCOSE SERPL-MCNC: 97 MG/DL (ref 70–110)
GLUCOSE UR QL STRIP: NEGATIVE
HBA1C MFR BLD: 5.3 % (ref 4.5–6.2)
HCT VFR BLD AUTO: 41.4 % (ref 37–48.5)
HGB BLD-MCNC: 13.9 G/DL (ref 12–16)
HGB UR QL STRIP: NEGATIVE
IMM GRANULOCYTES # BLD AUTO: 0.01 K/UL (ref 0–0.04)
IMM GRANULOCYTES NFR BLD AUTO: 0.1 % (ref 0–0.5)
INR PPP: 1 (ref 0.8–1.2)
INR PPP: 1 (ref 0.8–1.2)
INTERVENTRICULAR SEPTUM: 0.81 CM (ref 0.6–1.1)
IVRT: 132.6 MSEC
KETONES UR QL STRIP: NEGATIVE
LEFT ATRIUM SIZE: 3.2 CM
LEFT INTERNAL DIMENSION IN SYSTOLE: 2.61 CM (ref 2.1–4)
LEFT VENTRICLE DIASTOLIC VOLUME INDEX: 34.1 ML/M2
LEFT VENTRICLE DIASTOLIC VOLUME: 62.41 ML
LEFT VENTRICLE MASS INDEX: 48 G/M2
LEFT VENTRICLE SYSTOLIC VOLUME INDEX: 13.6 ML/M2
LEFT VENTRICLE SYSTOLIC VOLUME: 24.89 ML
LEFT VENTRICULAR INTERNAL DIMENSION IN DIASTOLE: 3.81 CM (ref 3.5–6)
LEFT VENTRICULAR MASS: 87.06 G
LEUKOCYTE ESTERASE UR QL STRIP: NEGATIVE
LV LATERAL E/E' RATIO: 8.18 M/S
LV SEPTAL E/E' RATIO: 10 M/S
LVOT MG: 2.28 MMHG
LVOT MV: 0.71 CM/S
LYMPHOCYTES # BLD AUTO: 3 K/UL (ref 1–4.8)
LYMPHOCYTES NFR BLD: 37.7 % (ref 18–48)
MAGNESIUM SERPL-MCNC: 1.9 MG/DL (ref 1.6–2.6)
MCH RBC QN AUTO: 29.8 PG (ref 27–31)
MCHC RBC AUTO-ENTMCNC: 33.6 G/DL (ref 32–36)
MCV RBC AUTO: 89 FL (ref 82–98)
MONOCYTES # BLD AUTO: 0.4 K/UL (ref 0.3–1)
MONOCYTES NFR BLD: 5.3 % (ref 4–15)
MV A" WAVE DURATION": 153.32 MSEC
MV PEAK A VEL: 0.58 M/S
MV PEAK E VEL: 0.9 M/S
MV STENOSIS PRESSURE HALF TIME: 56.35 MS
MV VALVE AREA P 1/2 METHOD: 3.9 CM2
NEUTROPHILS # BLD AUTO: 4.1 K/UL (ref 1.8–7.7)
NEUTROPHILS NFR BLD: 51.2 % (ref 38–73)
NITRITE UR QL STRIP: NEGATIVE
NRBC BLD-RTO: 0 /100 WBC
PH UR STRIP: 8 [PH] (ref 5–8)
PHOSPHATE SERPL-MCNC: 3.5 MG/DL (ref 2.7–4.5)
PISA TR MAX VEL: 2.59 M/S
PLATELET # BLD AUTO: 312 K/UL (ref 150–450)
PMV BLD AUTO: 9.1 FL (ref 9.2–12.9)
POTASSIUM SERPL-SCNC: 3.8 MMOL/L (ref 3.5–5.1)
PROT SERPL-MCNC: 7 G/DL (ref 6–8.4)
PROT UR QL STRIP: ABNORMAL
PROTHROMBIN TIME: 10.3 SEC (ref 9–12.5)
PROTHROMBIN TIME: 10.6 SEC (ref 9–12.5)
PV MV: 0.6 M/S
PV PEAK VELOCITY: 0.84 CM/S
RA PRESSURE: 3 MMHG
RBC # BLD AUTO: 4.66 M/UL (ref 4–5.4)
RV TISSUE DOPPLER FREE WALL SYSTOLIC VELOCITY 1 (APICAL 4 CHAMBER VIEW): 0.01 CM/S
SODIUM SERPL-SCNC: 137 MMOL/L (ref 136–145)
SP GR UR STRIP: >1.03 (ref 1–1.03)
TDI LATERAL: 0.11 M/S
TDI SEPTAL: 0.09 M/S
TDI: 0.1 M/S
TR MAX PG: 27 MMHG
TRICUSPID ANNULAR PLANE SYSTOLIC EXCURSION: 2.24 CM
TROPONIN I SERPL HS-MCNC: <2.3 PG/ML (ref 0–14.9)
TV REST PULMONARY ARTERY PRESSURE: 30 MMHG
URN SPEC COLLECT METH UR: ABNORMAL
UROBILINOGEN UR STRIP-ACNC: NEGATIVE EU/DL
WBC # BLD AUTO: 7.95 K/UL (ref 3.9–12.7)

## 2023-01-09 PROCEDURE — 84484 ASSAY OF TROPONIN QUANT: CPT | Performed by: INTERNAL MEDICINE

## 2023-01-09 PROCEDURE — 96372 THER/PROPH/DIAG INJ SC/IM: CPT | Performed by: INTERNAL MEDICINE

## 2023-01-09 PROCEDURE — 93306 TTE W/DOPPLER COMPLETE: CPT

## 2023-01-09 PROCEDURE — 36415 COLL VENOUS BLD VENIPUNCTURE: CPT | Performed by: INTERNAL MEDICINE

## 2023-01-09 PROCEDURE — A9585 GADOBUTROL INJECTION: HCPCS | Performed by: INTERNAL MEDICINE

## 2023-01-09 PROCEDURE — 25500020 PHARM REV CODE 255: Performed by: INTERNAL MEDICINE

## 2023-01-09 PROCEDURE — 25000003 PHARM REV CODE 250: Performed by: INTERNAL MEDICINE

## 2023-01-09 PROCEDURE — 80053 COMPREHEN METABOLIC PANEL: CPT | Performed by: INTERNAL MEDICINE

## 2023-01-09 PROCEDURE — 85730 THROMBOPLASTIN TIME PARTIAL: CPT | Performed by: INTERNAL MEDICINE

## 2023-01-09 PROCEDURE — 83036 HEMOGLOBIN GLYCOSYLATED A1C: CPT | Performed by: INTERNAL MEDICINE

## 2023-01-09 PROCEDURE — 93306 TTE W/DOPPLER COMPLETE: CPT | Mod: 26,,, | Performed by: GENERAL PRACTICE

## 2023-01-09 PROCEDURE — G0378 HOSPITAL OBSERVATION PER HR: HCPCS

## 2023-01-09 PROCEDURE — 82553 CREATINE MB FRACTION: CPT | Performed by: INTERNAL MEDICINE

## 2023-01-09 PROCEDURE — 85610 PROTHROMBIN TIME: CPT | Performed by: INTERNAL MEDICINE

## 2023-01-09 PROCEDURE — 92522 EVALUATE SPEECH PRODUCTION: CPT

## 2023-01-09 PROCEDURE — 92610 EVALUATE SWALLOWING FUNCTION: CPT

## 2023-01-09 PROCEDURE — 93306 ECHO (CUPID ONLY): ICD-10-PCS | Mod: 26,,, | Performed by: GENERAL PRACTICE

## 2023-01-09 PROCEDURE — 85025 COMPLETE CBC W/AUTO DIFF WBC: CPT | Performed by: INTERNAL MEDICINE

## 2023-01-09 PROCEDURE — 97161 PT EVAL LOW COMPLEX 20 MIN: CPT

## 2023-01-09 PROCEDURE — 81003 URINALYSIS AUTO W/O SCOPE: CPT | Performed by: INTERNAL MEDICINE

## 2023-01-09 PROCEDURE — 84100 ASSAY OF PHOSPHORUS: CPT | Performed by: INTERNAL MEDICINE

## 2023-01-09 PROCEDURE — 21400001 HC TELEMETRY ROOM

## 2023-01-09 PROCEDURE — 63600175 PHARM REV CODE 636 W HCPCS: Performed by: INTERNAL MEDICINE

## 2023-01-09 PROCEDURE — 83735 ASSAY OF MAGNESIUM: CPT | Performed by: INTERNAL MEDICINE

## 2023-01-09 PROCEDURE — 97165 OT EVAL LOW COMPLEX 30 MIN: CPT

## 2023-01-09 RX ORDER — ACETAMINOPHEN 325 MG/1
650 TABLET ORAL EVERY 6 HOURS PRN
Status: DISCONTINUED | OUTPATIENT
Start: 2023-01-09 | End: 2023-01-11 | Stop reason: HOSPADM

## 2023-01-09 RX ORDER — ENOXAPARIN SODIUM 100 MG/ML
1 INJECTION SUBCUTANEOUS
Status: DISCONTINUED | OUTPATIENT
Start: 2023-01-09 | End: 2023-01-11

## 2023-01-09 RX ORDER — BUTALBITAL, ACETAMINOPHEN AND CAFFEINE 50; 325; 40 MG/1; MG/1; MG/1
1 TABLET ORAL ONCE
Status: COMPLETED | OUTPATIENT
Start: 2023-01-09 | End: 2023-01-09

## 2023-01-09 RX ORDER — GADOBUTROL 604.72 MG/ML
10 INJECTION INTRAVENOUS
Status: COMPLETED | OUTPATIENT
Start: 2023-01-09 | End: 2023-01-09

## 2023-01-09 RX ORDER — SODIUM CHLORIDE 9 MG/ML
INJECTION, SOLUTION INTRAVENOUS CONTINUOUS
Status: DISCONTINUED | OUTPATIENT
Start: 2023-01-09 | End: 2023-01-11 | Stop reason: HOSPADM

## 2023-01-09 RX ADMIN — GADOBUTROL 10 ML: 604.72 INJECTION INTRAVENOUS at 11:01

## 2023-01-09 RX ADMIN — ASPIRIN 81 MG: 81 TABLET, COATED ORAL at 08:01

## 2023-01-09 RX ADMIN — BUTALBITAL, ACETAMINOPHEN, AND CAFFEINE 1 TABLET: 50; 325; 40 TABLET, COATED ORAL at 03:01

## 2023-01-09 RX ADMIN — IOHEXOL 100 ML: 350 INJECTION, SOLUTION INTRAVENOUS at 02:01

## 2023-01-09 RX ADMIN — ACETAMINOPHEN 650 MG: 325 TABLET ORAL at 03:01

## 2023-01-09 RX ADMIN — SODIUM CHLORIDE: 0.9 INJECTION, SOLUTION INTRAVENOUS at 04:01

## 2023-01-09 RX ADMIN — ACETAMINOPHEN 650 MG: 325 TABLET ORAL at 07:01

## 2023-01-09 RX ADMIN — ENOXAPARIN SODIUM 80 MG: 100 INJECTION SUBCUTANEOUS at 04:01

## 2023-01-09 RX ADMIN — ATORVASTATIN CALCIUM 40 MG: 40 TABLET, FILM COATED ORAL at 08:01

## 2023-01-09 NOTE — PT/OT/SLP EVAL
Occupational Therapy   Evaluation and Discharge Note    Name: Shereen Walker  MRN: [id number]  Admitting Diagnosis: <principal problem not specified>  Recent Surgery: * No surgery found *      Recommendations:     Discharge Recommendations: home  Discharge Equipment Recommendations: none  Barriers to discharge:  None    Assessment:     Shereen Walker is a 27 y.o. female with a medical diagnosis of <principal problem not specified>. At this time, patient is functioning at their prior level of function and does not require further acute OT services.     Plan:     During this hospitalization, patient does not require further acute OT services.  Please re-consult if situation changes.    Plan of Care Reviewed with: patient    Subjective     Chief Complaint: None  Patient/Family Comments/goals: To go home.    Occupational Profile:  Living Environment: lives with family in a 1 story home with no steps to enter.   Previous level of function: independent with ADLs, IADLs, working and driving. Works as a manager at [employer].  Roles and Routines: Mother, Worker  Equipment Used at home: none  Assistance upon Discharge: Family    Pain/Comfort:  Pain Rating 1: 0/10  Pain Rating Post-Intervention 1: 0/10    Patients cultural, spiritual, Taoist conflicts given the current situation: no    Objective:     Communicated with: nurse prior to session.  Patient found HOB elevated with telemetry, peripheral IV upon OT entry to room.    General Precautions: Standard,  (None)  Orthopedic Precautions: N/A  Braces: N/A  Respiratory Status: Room air     Occupational Performance:    Bed Mobility:    Patient completed Scooting/Bridging with independence  Patient completed Supine to Sit with independence  Patient completed Sit to Supine with independence    Functional Mobility/Transfers:  Patient completed Sit <> Stand Transfer with independence  with  no assistive device   Patient completed Toilet Transfer Step Transfer technique with  independence with  no AD  Functional Mobility: ambulated 30 feet in the hospital room and bathroom independently with no AD.    Activities of Daily Living:  Grooming: independence to wash hands standing at the sink.  Toileting: independence to perform toilet hygiene and clothing management from commode.    Cognitive/Visual Perceptual:  Cognitive/Psychosocial Skills:     -       Oriented to: Person, Place, Time, and Situation   -       Follows Commands/attention:Follows multistep  commands  -       Communication: clear/fluent  -       Memory: No Deficits noted  -       Safety awareness/insight to disability: intact   -       Mood/Affect/Coping skills/emotional control: Cooperative and Pleasant  Visual/Perceptual:      -Intact Acuity    Physical Exam:  Balance:    -       Sitting/Standing: WFL  Upper Extremity Range of Motion:     -       Right Upper Extremity: WFL  -       Left Upper Extremity: WFL  Upper Extremity Strength:    -       Right Upper Extremity: WFL  -       Left Upper Extremity: WFL   Strength:    -       Right Upper Extremity: WFL  -       Left Upper Extremity: WFL  Fine Motor Coordination:    -       Intact    AMPAC 6 Click ADL:  AMPAC Total Score: 24    Treatment & Education:  Patient is currently independent with sitting/standing ADL activity with no safety concerns. Reports right sided numbness has mostly resolved.    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

## 2023-01-09 NOTE — PLAN OF CARE
[facility]  Initial Discharge Assessment       Primary Care Provider: Aleida Yip MD    Admission Diagnosis: Weakness [R53.1]    Admission Date: 1/8/2023  Expected Discharge Date: 1/9/2023    Discharge assessment completed with patient at bedside. Information verified as correct on facesheet. Denies HH/HD/DME at home/coumadin. Discharge plan is home. Family to provide transport on discharge. Patient requesting PCP on discharge. CM to follow     Discharge Barriers Identified: None    Payor: MEDICAID / Plan: [facility] [facility] / Product Type: Managed Medicaid /     Extended Emergency Contact Information  Primary Emergency Contact: DeniseLaure   [facility]  Home Phone: [phone]  Relation: Mother  Secondary Emergency Contact: Jade Harshil  Mobile Phone: [phone]  Relation: Significant other    Discharge Plan A: Home with family  Discharge Plan B: Home      [id number] - [place], [place] - [address]  [address]  [place] [address]  Phone: [phone] Fax: [phone]    [id number] - [place], [place] - [address]  [address]  [place] [address]  Phone: [phone] Fax: [phone]    [id number] - [place], [place] - [address]  [address]  [place] [address]  Phone: [phone] Fax: [phone]      Initial Assessment (most recent)       Adult Discharge Assessment - 01/09/23 1227          Discharge Assessment    Assessment Type Discharge Planning Assessment     Confirmed/corrected address, phone number and insurance Yes     Confirmed Demographics Correct on Facesheet     Source of Information patient     Does patient/caregiver understand observation status Yes     Communicated JOSSELIN with patient/caregiver Yes     Reason For Admission CVA     Facility Arrived From: home     Do you expect to return to your current living situation? Yes     Do you have help at home or someone to help you manage your care at home? Yes     Prior to  hospitilization cognitive status: Alert/Oriented     Current cognitive status: Alert/Oriented     Equipment Currently Used at Home none     Readmission within 30 days? No     Patient currently being followed by outpatient case management? No     Do you currently have service(s) that help you manage your care at home? No     Do you take prescription medications? No     Do you have prescription coverage? Yes     Coverage [employer] [place] medicaid     Do you have any problems affording any of your prescribed medications? No     Is the patient taking medications as prescribed? yes     Who is going to help you get home at discharge? family     How do you get to doctors appointments? car, drives self;family or friend will provide     Are you on dialysis? No     Do you take coumadin? No     Discharge Plan A Home with family     Discharge Plan B Home     DME Needed Upon Discharge  none     Discharge Plan discussed with: Patient     Discharge Barriers Identified None

## 2023-01-09 NOTE — PROGRESS NOTES
"[facility] Medicine Progress Note  Patient Name: Shereen Walker MRN: [id number]   Patient Class: OP- Observation  Length of Stay: 0   Admission Date: 1/8/2023 12:07 PM Attending Physician: Vaishnavi Desai MD   Primary Care Provider: Aleida Yip MD Face-to-Face encounter date: 01/09/2023   Chief Complaint: Headache (X 2 WEEKS), Neck Pain, and Facial Pain      Subjective:    Interval History   No new weakness.   Denies headache.   Does not think she has returned completely to baseline.   Denies chest pain, shortness of breath, palpitations, abdominal pain, nausea/vomiting.   No concerns/issues overnight reported by the patient or the nursing staff.  Reviewed the labs and discussed the plan of care.   No family present at bedside.     Review of Systems   All other Review of Systems were found to be negative expect for that mentioned already in HPI.     Hospital Course     01/09/2023     aspirin  81 mg Oral Daily    atorvastatin  40 mg Oral Daily    enoxaparin  40 mg Subcutaneous Daily    ipratropium  1 spray Each Nostril BID       acetaminophen, sodium chloride 0.9%      Objective:   Physical Exam  /84 (BP Location: Right arm, Patient Position: Sitting)   Pulse 88   Temp 98.3 °F (36.8 °C) (Oral)   Resp 16   Ht 5' 3" (1.6 m)   Wt 79.9 kg (176 lb 0.6 oz)   LMP 01/03/2023 (Exact Date)   SpO2 97%   Breastfeeding No   BMI 31.18 kg/m²   Constitutional: No distress.   HENT: Atraumatic.   Cardiovascular: Normal rate, regular rhythm and normal heart sounds.   Pulmonary/Chest: Effort normal. Clear to auscultation bilaterally. No wheezes.   Abdominal: Soft. Bowel sounds are normal. Exhibits no distension and no mass. No tenderness  Neurological: Alert.   Skin: Skin is warm and dry.     Labs and Imaging    Significant Labs: All pertinent labs within the past 24 hours have been reviewed.    Significant Imaging: I have reviewed all pertinent imaging results/findings within the past 24 " hours.    I have reviewed the Vitals, labs and imaging as above.     Assessment & Plan:   Shereen Walker is a 27 y.o. female admitted for    Active Hospital Problems    Diagnosis    *Dural venous sinus thrombosis    Numbness    Headache       Plan  MRI confirmed dural venous sinus thrombosis  CT head venography ordered. Results pending  Will start Lovenox full dose anti-coagulation  Discussed with neurology. Recommended heme/onc evaluation    Active PT: Yes  Active OT: No  Active SLP: No    Core measures:  - Code status:   - Diet: Cardiac  VTE Risk Mitigation (From admission, onward)           Ordered     enoxaparin injection 40 mg  Daily         01/08/23 1640     IP VTE HIGH RISK PATIENT  Once         01/08/23 1640     Place sequential compression device  Until discontinued         01/08/23 1640                    Discharge Planning:   Discharge Planning   JOSSELIN: 01/10    Code Status: Full Code   Is the patient medically ready for discharge?: no    Reason for patient still in hospital (select all that apply): Patient trending condition  Discharge Plan A: Home with assistance from family        Above encounter included review of the medical records, interviewing and examining the patient face-to-face, discussion with family and other health care providers, ordering and interpreting lab/test results and formulating a plan of care.     Medical Decision Making:  [] Low Complexity  [] Moderate Complexity  [x] High Complexity    Vaishnavi Desai MD  [facility] Hospitalist  01/09/2023

## 2023-01-09 NOTE — PT/OT/SLP EVAL
Speech Language Pathology Evaluation  Cognitive/Bedside Swallow    Patient Name:  Shereen Walker   MRN:  [id number]  Admitting Diagnosis: <principal problem not specified>    Recommendations:                  General Recommendations:  Follow-up not indicated  Diet recommendations:  Regular Diet - IDDSI Level 7, Thin   Aspiration Precautions: N/ A  General Precautions: Standard,    Communication strategies:  none    History:     Headache        X 2 WEEKS    Neck Pain    Facial Pain         HPI: ED note  27-year-old female presents complaining of intermittent left arm heaviness and numbness patient reports this has been happening for a few minutes daily for the past 3 days patient describes a headache that occurs after these symptoms patient reports 2 episodes on Friday while episode on Saturday and 1 episode starting at approximately 10:30am a.m. today and resolving at approximately 11:00 a.m..  Patient reports this is associated with a feeling of heaviness in her left arm and loss of fine motor coordination patient reports that each time it involved her left arm patient reports that these episodes last about 20-30 minutes and then her arm returns to normal she also reports that there is a feeling of numbness to the left side of her face and the left side of her tongue patient reports that afterwards she notices a headache that develops.  Patient reports no current symptoms right now.  Patient has no significant past medical history patient has no other acute complaints at this time.       1/8/2022  Pt has no symptoms but has has 3 episodes of left sided numbness and a headache concurrently.      Past Medical History:   Diagnosis Date    Herpes simplex virus (HSV) infection     HSV II       Past Surgical History:   Procedure Laterality Date    FINGER SURGERY Left 1995    TONSILLECTOMY, ADENOIDECTOMY, BILATERAL MYRINGOTOMY AND TUBES Bilateral 1996    WISDOM TOOTH EXTRACTION Bilateral 2010         Prior Intubation HX:   N/A    Modified Barium Swallow: N/A    Chest X-Rays: Normal    CT:  Normal    Prior diet: Reg/Thin.    Occupation/hobbies/homemaking: N/A.    Subjective     Pt alert and pleasant;  full arousal and alertness.  Patient goals: N/A     Pain/Comfort:  Pain Rating 1: 0/10    Respiratory Status: Room air    Objective:     Cognitive Status:    WFL       Receptive Language:   Comprehension:      WFL    Pragmatics:    WFL    Expressive Language:  Verbal:    WFL      Motor Speech:  WFL    Voice:   WFL    Oral Musculature Evaluation  Oral Musculature: WFL  Dentition: present and adequate  Secretion Management: adequate  Mucosal Quality: good  Mandibular Strength and Mobility: WFL  Oral Labial Strength and Mobility: WFL  Lingual Strength and Mobility: WFL  Velar Elevation: WFL  Buccal Strength and Mobility: WF    Bedside Swallow Eval:   Consistencies Assessed:  Thin liquids :  Continuous and consecutive sip intake  Solids        Oral Phase:   WFL    Pharyngeal Phase:   no overt clinical signs/symptoms of aspiration  no overt clinical signs/symptoms of pharyngeal dysphagia    Compensatory Strategies  None    Treatment: N/A    Assessment:     Shereen Walker is a 27 y.o. female with no overt s/s of oral/pharyngeal dysphagia, and/or any acute changes in communication-cognitive skill sets.  No further evaluation or treatment by SLP is indicated at this time.       Goals:   Multidisciplinary Problems       SLP Goals       Not on file                    Plan:     Patient to be seen:      Plan of Care expires:     Plan of Care reviewed with:      SLP Follow-Up:  No       Discharge recommendations:      Barriers to Discharge:  None    Time Tracking:     SLP Treatment Date:      Speech Start Time:  0940  Speech Stop Time:  0955     Speech Total Time (min):  15 min    Billable Minutes: Eval 15mins     01/09/2023

## 2023-01-09 NOTE — CONSULTS
[facility]  Department of Neurology  Neurology Consultation Note        PATIENT NAME: Shereen Walker  MRN: [id number]  CSN: [id number]      TODAY'S DATE: 01/09/2023  ADMIT DATE: 1/8/2023                            CONSULTING PROVIDER: Andrey Angeles MD  CONSULT REQUESTED BY: Vaishnavi Desai MD      Reason for consult: Headache, weakness and numbness       History obtained from chart review and the patient.    HPI per EMR: Shereen Walker is a 27 y.o. female presents with intermittent left arm heaviness and numbness patient reports this has been happening for a few minutes daily for the past 3 days patient describes a headache that occurs after these symptoms patient reports 2 episodes on Friday while episode on Saturday and 1 episode starting at approximately 10:30am a.m. today and resolving at approximately 11:00 a.m..  Patient reports this is associated with a feeling of heaviness in her left arm and loss of fine motor coordination patient reports that each time it involved her left arm patient reports that these episodes last about 20-30 minutes and then her arm returns to normal she also reports that there is a feeling of numbness to the left side of her face and the left side of her tongue patient reports that afterwards she notices a headache that develops.  Patient reports no current symptoms right now.  Patient has no significant past medical history patient has no other acute complaints at this time.       Neurology consult:  Patient was seen examined by me.  She states that over the last 2 weeks, she has been having a pressure like headache mostly on the right side radiating from the back of the head to the front.  She has associated photophobia however no nausea or vomiting.  She denies any positional or diurnal variation to the headache.  She denies any vision changes associated with the headache.  For the past 1 week, she has been having intermittent weakness/heaviness and numbness in  her right and left upper and lower extremities which is fluctuating.  She states that her headache has almost been persistent for the past 1 week and felt like a sinus pressure.    She denies any history of migraines or headaches in the past.  Denies any blood clots.  She endorses of 1 spontaneous miscarriage (1st trimester).  She denies any other significant past medical history.    PREVIOUS MEDICAL HISTORY:  Past Medical History:   Diagnosis Date    Herpes simplex virus (HSV) infection     HSV II     PREVIOUS SURGICAL HISTORY:  Past Surgical History:   Procedure Laterality Date    FINGER SURGERY Left 1995    TONSILLECTOMY, ADENOIDECTOMY, BILATERAL MYRINGOTOMY AND TUBES Bilateral 1996    WISDOM TOOTH EXTRACTION Bilateral 2010     FAMILY MEDICAL HISTORY:  Family History   Problem Relation Age of Onset    Hypertension Maternal Grandmother     Diabetes Maternal Grandmother     Kidney disease Maternal Grandmother     Arrhythmia Neg Hx     Cardiomyopathy Neg Hx     Congenital heart disease Neg Hx     Heart attacks under age 50 Neg Hx     Pacemaker/defibrilator Neg Hx      SOCIAL HISTORY:  Social History     Tobacco Use    Smoking status: Never    Smokeless tobacco: Never   Substance Use Topics    Alcohol use: No    Drug use: No     ALLERGIES:  Review of patient's allergies indicates:   Allergen Reactions    Suprax [cefixime] Hives     HOME MEDICATIONS:  Prior to Admission medications    Medication Sig Start Date End Date Taking? Authorizing Provider   amoxicillin-clavulanate 875-125mg (AUGMENTIN) 875-125 mg per tablet Take 1 tablet by mouth every 12 (twelve) hours. 12/31/22  Yes Historical Provider   etonogestreL-ethinyl estradioL (NUVARING) 0.12-0.015 mg/24 hr vaginal ring Place 1 each vaginally every 28 days. 12/20/22  Yes Historical Provider   ipratropium (ATROVENT) 21 mcg (0.03 %) nasal spray 1 spray by Each Nostril route 2 (two) times daily. 12/31/22  Yes Historical Provider   brompheniramine-pseudoeph-DM (BROMFED  "DM) 2-30-10 mg/5 mL Syrp Take 10 mLs by mouth 4 (four) times daily as needed. 12/31/22   Historical Provider     CURRENT SCHEDULED MEDICATIONS:   aspirin  81 mg Oral Daily    atorvastatin  40 mg Oral Daily    enoxaparin  40 mg Subcutaneous Daily    ipratropium  1 spray Each Nostril BID     CURRENT INFUSIONS:    CURRENT PRN MEDICATIONS:  acetaminophen, sodium chloride 0.9%    REVIEW OF SYSTEMS:  Please refer to the HPI for all pertinent positive and negative findings. A comprehensive review of all other systems was negative.       PHYSICAL EXAM:  Patient Vitals for the past 24 hrs:   BP Temp Temp src Pulse Resp SpO2 Height Weight   01/09/23 0748 129/66 98.3 °F (36.8 °C) Oral 90 17 95 % -- --   01/09/23 0300 (!) 119/90 -- -- 72 16 97 % -- --   01/08/23 1956 135/88 98.1 °F (36.7 °C) Oral 91 16 97 % -- --   01/08/23 1704 -- -- -- -- -- -- 5' 3" (1.6 m) 79.9 kg (176 lb 0.6 oz)   01/08/23 1644 138/75 98.1 °F (36.7 °C) Oral 103 20 98 % -- --   01/08/23 1600 116/63 -- -- 83 -- 99 % -- --   01/08/23 1430 103/60 -- -- 69 20 99 % -- --   01/08/23 1400 113/76 -- -- 77 13 99 % -- --   01/08/23 1356 121/80 -- -- 80 17 98 % -- --   01/08/23 1330 115/71 -- -- 71 18 99 % -- --   01/08/23 1300 122/73 -- -- 76 -- 100 % -- --   01/08/23 1209 (!) 151/90 98.5 °F (36.9 °C) Oral 94 18 97 % 5' 3" (1.6 m) 79.4 kg (175 lb)       GENERAL APPEARANCE: Alert, well-developed, well-nourished female in no acute distress.  HEENT: Normocephalic and atraumatic. PERRL. Oropharynx unremarkable.  PULM: Normal respiratory effort. No accessory muscle use.  CV: RRR.  ABDOMEN: Soft, nontender.  EXTREMITIES: No obvious signs of vascular compromise. Pulses present. No cyanosis, clubbing or edema.  SKIN: Clear; no rashes, lesions or skin breaks in exposed areas.    NEURO:  MENTAL STATUS:   , Patient awake and oriented to time, place, and person, recent/remote memory normal, attention span/concentration normal, speech fluent without paraphasic errors, good " comprehension with appropriate thought content, and fund of knowledge appropriate for patient's level of education.  Affect euthymic.    CRANIAL NERVES:  CN I: Not tested.  CN II: Fundoscopic exam deferred.  CN III, IV, VI: Pupils equal, round and reactive to light.  Extraocular movements full and intact.  CN V: Facial sensation normal.  CN VII: Facial asymmetry absent.  CN VIII: Hearing grossly normal and equal bilaterally.  No skew deviation or pathologic nystagmus.  CN IX, X: Palate elevates symmetrically. Speech/articulation is clear without dysarthria.  CN XI: Shoulder shrug and chin rotation equal with good strength.  CN XII: Tongue protrusion midline.    MOTOR:  Bulk normal. Tone normal and symmetric throughout.  Abnormal movements absent.  Tremor: none present.  Strength 5/5 throughout.    REFLEXES:  DTRs 2+ throughout.  Plantar response downgoing bilaterally.  SENSATION: grossly intact throughout.  COORDINATION: normal finger-to-nose.  STATION: not tested.  GAIT: not tested.      NIHSS:  1a      Level of Consciousness (alert, drowsy, etc.):   0=alert; keenly responsive  1b.     Level of Consciousness Questions (month, age): 0= able to answer both questions  1c.     Level of Consciousness Commands (open, close eyes, make fist, let go):  0=Answers both tasks correctly  2.      Best Gaze (eyes open - patient follows examiner's finger or face):      0=normal  3.      Visual (introduce visual stimulus/threat to patient's visual field quadrants):  0=No visual loss  4.      Facial Palsy (show teeth, raise eyebrows and squeeze eyes shut):        0=Normal symmetric movement  5a.     Motor Arm - Left (elevate extremity 90 degrees and score drift/movement):       0=No drift, limb holds 90 (or 45) degrees for full 10 seconds  5b.     Motor Arm - Right (elevate extremity 90 degrees and score drift/movement):      0=No drift, limb holds 90 (or 45) degrees for full 10 seconds  6a.     Motor Leg - Left (elevate extremity  30 degrees and score drift/movement):       0=No drift, limb holds 90 (or 45) degrees for full 10 seconds  6b      Motor Leg - Right (elevate extremity 30 degrees and score drift/movement):      0=No drift, limb holds 90 (or 45) degrees for full 10 seconds  7.      Limb Ataxia (finger-nose, heel down shin):      0=Absent  8.      Sensory (pin prick to face, arm, trunk, and leg - compare side to side):        0=Normal; no sensory loss  9.      Best Language (name items, describe a picture and read sentences):      0=No aphasia, normal  10.     Dysarthria (evaluate speech clarity by patient repeating listed words): 0=Normal  11.     Extinction and Inattention (use prior testing to identify neglect or double simultaneous stimuli testing):      0=No abnormality          NIH Stroke Scale Total:         0      Labs:  Recent Labs   Lab 01/08/23  1254 01/09/23  0432    137   K 4.1 3.8    99   CO2 25 23   BUN 10 7   CREATININE 0.4* 0.5   GLU 93 97   CALCIUM 9.2 8.9   PHOS  --  3.5   MG  --  1.9     Recent Labs   Lab 01/08/23  1254 01/09/23  0433   WBC 7.40 7.95   HGB 14.9 13.9   HCT 44.2 41.4    312     Recent Labs   Lab 01/08/23  1254 01/09/23  0432   ALBUMIN 4.2 3.7   PROT 7.6 7.0   BILITOT 0.6 0.4   ALKPHOS 49* 48*   ALT 9* 8*   AST 14 12     Lab Results   Component Value Date    INR 1.0 01/09/2023     Lab Results   Component Value Date    TRIG 81 01/08/2023    HDL 69 01/08/2023    CHOLHDL 45.7 01/08/2023     Lab Results   Component Value Date    HGBA1C 5.3 01/09/2023     No results found for: PROTEINCSF, GLUCCSF, WBCCSF    Imaging:  I have reviewed and interpreted the pertinent imaging and lab results.      CTA Head and Neck (xpd)  CTA HEAD AND NECK    CLINICAL DATA: Transient ischemic attack    CMS MANDATED QUALITY DATA - CT RADIATION  436    All CT scans at this facility utilize dose modulation, iterative reconstruction, and/or weight based dosing when appropriate to reduce radiation dose to as low  as reasonably achievable.    CMS MANDATED QUALITY DATA - CAROTID - 195    All measurements and percent stenosis described below were determined using NASCET criteria or criteria similar to NASCET, as defined by the Society of Radiologists in Ultrasound Consensus Conference, Radiology, 2003    CT angiography of the head and neck was performed. 100 mL nonionic contrast was administered. 3-D multiplanar reconstruction images were obtained and stored as part of the patient's permanent medical record.    CTA NECK:    The aortic arch and great vessel origins are within normal limits.    The bilateral common carotid and internal carotid arteries are normal in course and caliber, without significant plaque formation. Both vertebral arteries are patent with dominant left vertebral artery noted.    CTA BRAIN:    Intracranial portions of the internal carotid arteries are patent and within normal limits. The basilar artery is unremarkable.    The bilateral anterior, middle, and posterior cerebral arteries are within normal limits, with no evidence of major branch occlusion, high-grade stenosis, or aneurysm. Patent posterior communicating arteries are noted bilaterally. Anterior communicating artery is also visualized.    IMPRESSION:  1. Normal CTA HEAD AND NECK.    Electronically signed by:  Alo Tony MD  1/8/2023 2:15 PM CST Workstation: [id number]  CT Head Without Contrast  CT HEAD WITHOUT CONTRAST    CMS MANDATED QUALITY DATA - CT RADIATION  436    All CT scans at this facility utilize dose modulation, iterative reconstruction, and/or weight based dosing when appropriate to reduce radiation dose to as low as reasonably achievable    Clinical data: Neurological deficit, stroke suspected.    FINDINGS: Noninfusion images were obtained from the skull base to the vertex. There is no intracranial mass, hemorrhage, or midline shift. Ventricles and sulci are normal. There are no pathologic extra-axial fluid collections.  There is no evidence of ischemic change or edema. Cerebellum and brainstem are normal.    The calvarium is intact.    IMPRESSION:    1. Normal CT head without contrast.    Electronically signed by:  Alo Tony MD  1/8/2023 2:12 PM CST Workstation: [id number]  X-Ray Chest AP Portable  Chest single view    Clinical data:Headache, neck pain.    FINDINGS: AP view of the chest demonstrates no cardiac, pulmonary, or osseous abnormalities.    IMPRESSION:  1. Normal chest single view.    Electronically signed by:  Alo Tony MD  1/8/2023 1:05 PM CST Workstation: [id number]         ASSESSMENT & PLAN:    Cerebral venous sinus thrombosis  Headache  Weakness and numbness    Plan  Patient presented with persistent pressure-like headache mostly on the right side and intermittent fluctuating weakness and numbness in her extremities.  MRI brain showed concern for superior sagittal sinus, right transverse sinus abnormality concerning for cerebral sinus thrombosis.  CT venogram head ordered to confirm above findings  If CT venogram is positive, recommend starting patient on heparin drip/Lovenox.  Recommend Hematology consult  Will need to check hypercoagulable panel  Recommend stopping the Nuvaring and consider progesterone only birth control prevent hypercoagulability.  PT OT  IV fluids with normal saline  Tylenol PRN for headache.  Currently headache is better.  If headache recurs or worsens, recommend headache cocktail with Benadryl, Reglan every 8 hours.  Will follow            Thank you kindly for including us in the care of this patient. Please do not hesitate to contact us with any questions.        55 minutes of care time has been spent evaluating with the patient. Time includes chart review not limited to diagnostic imaging, labs, and vitals, patient assessment, discussion with family and nursing, current order evaluations, and new order entries.       Andrey Angeles MD  Neurology/vascular Neurology  Date  of Service: 01/09/2023  10:14 AM    --------------------------------------------------------------------------------------------------------------------------------------------------------------------------------------------------------------------------------------------------------------  Please note: This note was transcribed using voice recognition software. Because of this technology there are often uinintended grammatical, spelling, and other transcription errors. Please disregard these errors.

## 2023-01-09 NOTE — PT/OT/SLP EVAL
"Physical Therapy Evaluation and Discharge Note    Patient Name:  Shereen Walker   MRN:  [id number]    Recommendations:     Discharge Recommendations: home  Discharge Equipment Recommendations: none   Barriers to discharge: None    Assessment:     Shereen Walker is a 27 y.o. female admitted with a medical diagnosis of <principal problem not specified>. .  At this time, patient is functioning at their prior level of function and does not require further acute PT services. She did report "faint tingling" of left hand.    Recent Surgery: * No surgery found *      Plan:     During this hospitalization, patient does not require further acute PT services.  Please re-consult if situation changes.      Subjective     Chief Complaint: faint tingling  Patient/Family Comments/goals: Home  Pain/Comfort:       Patients cultural, spiritual, Roman [employer] conflicts given the current situation:        Prior to admission, patients level of function was independent .  Equipment used at home: none.  DME owned (not currently used): none.      Objective:     Communicated with nurse prior to session.  Patient found supine with   upon PT entry to room.    General Precautions: Standard, fall    Orthopedic Precautions:    Braces:    Respiratory Status: Room air    Exams:  Cognitive Exam:  Patient is oriented to Person, Place, Time, and Situation  Sensation:    -       Impaired  L hand tingling  RLE ROM: WNL  RLE Strength: WNL  LLE ROM: WNL  LLE Strength: WNL    Functional Mobility:  Bed Mobility:     Supine to Sit: independence  Sit to Supine: independence  Transfers:     Sit to Stand:  independence with no AD  Gait: 50 ft no AD    AM-PAC 6 CLICK MOBILITY  Total Score:        Treatment and Education:  PT eval and D/C with pt at baseline level of mobility    AM-PAC 6 CLICK MOBILITY  Total Score:      Patient left supine with all lines intact and call button in reach.    GOALS:   Multidisciplinary Problems       Physical Therapy Goals       Not " on file                    History:     Past Medical History:   Diagnosis Date    Herpes simplex virus (HSV) infection     HSV II       Past Surgical History:   Procedure Laterality Date    FINGER SURGERY Left 1995    TONSILLECTOMY, ADENOIDECTOMY, BILATERAL MYRINGOTOMY AND TUBES Bilateral 1996    WISDOM TOOTH EXTRACTION Bilateral 2010       Time Tracking:     PT Received On: 01/09/23  PT Start Time: 1026     PT Stop Time: 1036  PT Total Time (min): 10 min     Billable Minutes: Evaluation 10 minutes      01/09/2023

## 2023-01-10 LAB
ALBUMIN SERPL BCP-MCNC: 3.6 G/DL (ref 3.5–5.2)
ALP SERPL-CCNC: 53 U/L (ref 55–135)
ALT SERPL W/O P-5'-P-CCNC: 8 U/L (ref 10–44)
ANION GAP SERPL CALC-SCNC: 5 MMOL/L (ref 8–16)
AST SERPL-CCNC: 12 U/L (ref 10–40)
BASOPHILS # BLD AUTO: 0.04 K/UL (ref 0–0.2)
BASOPHILS NFR BLD: 0.5 % (ref 0–1.9)
BILIRUB SERPL-MCNC: 0.4 MG/DL (ref 0.1–1)
BUN SERPL-MCNC: 9 MG/DL (ref 6–20)
CALCIUM SERPL-MCNC: 9.1 MG/DL (ref 8.7–10.5)
CHLORIDE SERPL-SCNC: 108 MMOL/L (ref 95–110)
CO2 SERPL-SCNC: 26 MMOL/L (ref 23–29)
CREAT SERPL-MCNC: 0.5 MG/DL (ref 0.5–1.4)
DIFFERENTIAL METHOD: NORMAL
EOSINOPHIL # BLD AUTO: 0.3 K/UL (ref 0–0.5)
EOSINOPHIL NFR BLD: 4.1 % (ref 0–8)
ERYTHROCYTE [DISTWIDTH] IN BLOOD BY AUTOMATED COUNT: 11.7 % (ref 11.5–14.5)
EST. GFR  (NO RACE VARIABLE): >60 ML/MIN/1.73 M^2
FERRITIN SERPL-MCNC: 51 NG/ML (ref 20–300)
FOLATE SERPL-MCNC: 8.6 NG/ML (ref 4–24)
GLUCOSE SERPL-MCNC: 90 MG/DL (ref 70–110)
HCT VFR BLD AUTO: 42.3 % (ref 37–48.5)
HGB BLD-MCNC: 13.9 G/DL (ref 12–16)
IMM GRANULOCYTES # BLD AUTO: 0.01 K/UL (ref 0–0.04)
IMM GRANULOCYTES NFR BLD AUTO: 0.1 % (ref 0–0.5)
IRON SERPL-MCNC: 54 UG/DL (ref 30–160)
LYMPHOCYTES # BLD AUTO: 3 K/UL (ref 1–4.8)
LYMPHOCYTES NFR BLD: 41.4 % (ref 18–48)
MCH RBC QN AUTO: 29.6 PG (ref 27–31)
MCHC RBC AUTO-ENTMCNC: 32.9 G/DL (ref 32–36)
MCV RBC AUTO: 90 FL (ref 82–98)
MONOCYTES # BLD AUTO: 0.4 K/UL (ref 0.3–1)
MONOCYTES NFR BLD: 5.4 % (ref 4–15)
NEUTROPHILS # BLD AUTO: 3.6 K/UL (ref 1.8–7.7)
NEUTROPHILS NFR BLD: 48.5 % (ref 38–73)
NRBC BLD-RTO: 0 /100 WBC
PLATELET # BLD AUTO: 315 K/UL (ref 150–450)
PMV BLD AUTO: 9.5 FL (ref 9.2–12.9)
POTASSIUM SERPL-SCNC: 3.8 MMOL/L (ref 3.5–5.1)
PROT SERPL-MCNC: 6.9 G/DL (ref 6–8.4)
RBC # BLD AUTO: 4.7 M/UL (ref 4–5.4)
SATURATED IRON: 16 % (ref 20–50)
SODIUM SERPL-SCNC: 139 MMOL/L (ref 136–145)
TOTAL IRON BINDING CAPACITY: 335 UG/DL (ref 250–450)
TRANSFERRIN SERPL-MCNC: 239 MG/DL (ref 200–375)
VIT B12 SERPL-MCNC: 176 PG/ML (ref 210–950)
WBC # BLD AUTO: 7.35 K/UL (ref 3.9–12.7)

## 2023-01-10 PROCEDURE — 80053 COMPREHEN METABOLIC PANEL: CPT | Performed by: INTERNAL MEDICINE

## 2023-01-10 PROCEDURE — 21400001 HC TELEMETRY ROOM

## 2023-01-10 PROCEDURE — 84466 ASSAY OF TRANSFERRIN: CPT | Performed by: INTERNAL MEDICINE

## 2023-01-10 PROCEDURE — 99222 PR INITIAL HOSPITAL CARE,LEVL II: ICD-10-PCS | Mod: ,,, | Performed by: INTERNAL MEDICINE

## 2023-01-10 PROCEDURE — 63600175 PHARM REV CODE 636 W HCPCS: Performed by: INTERNAL MEDICINE

## 2023-01-10 PROCEDURE — 82728 ASSAY OF FERRITIN: CPT | Performed by: INTERNAL MEDICINE

## 2023-01-10 PROCEDURE — 25000003 PHARM REV CODE 250: Performed by: INTERNAL MEDICINE

## 2023-01-10 PROCEDURE — 85025 COMPLETE CBC W/AUTO DIFF WBC: CPT | Performed by: INTERNAL MEDICINE

## 2023-01-10 PROCEDURE — 82746 ASSAY OF FOLIC ACID SERUM: CPT | Performed by: INTERNAL MEDICINE

## 2023-01-10 PROCEDURE — 36415 COLL VENOUS BLD VENIPUNCTURE: CPT | Performed by: INTERNAL MEDICINE

## 2023-01-10 PROCEDURE — 96372 THER/PROPH/DIAG INJ SC/IM: CPT | Performed by: INTERNAL MEDICINE

## 2023-01-10 PROCEDURE — 82607 VITAMIN B-12: CPT | Performed by: INTERNAL MEDICINE

## 2023-01-10 PROCEDURE — 99222 1ST HOSP IP/OBS MODERATE 55: CPT | Mod: ,,, | Performed by: INTERNAL MEDICINE

## 2023-01-10 RX ORDER — DIPHENHYDRAMINE HYDROCHLORIDE 50 MG/ML
12.5 INJECTION INTRAMUSCULAR; INTRAVENOUS ONCE
Status: COMPLETED | OUTPATIENT
Start: 2023-01-10 | End: 2023-01-10

## 2023-01-10 RX ORDER — METOCLOPRAMIDE HYDROCHLORIDE 5 MG/ML
5 INJECTION INTRAMUSCULAR; INTRAVENOUS ONCE
Status: COMPLETED | OUTPATIENT
Start: 2023-01-10 | End: 2023-01-10

## 2023-01-10 RX ORDER — HEPARIN SODIUM,PORCINE/D5W 25000/250
0-40 INTRAVENOUS SOLUTION INTRAVENOUS CONTINUOUS
Status: DISCONTINUED | OUTPATIENT
Start: 2023-01-10 | End: 2023-01-10

## 2023-01-10 RX ADMIN — ACETAMINOPHEN 650 MG: 325 TABLET ORAL at 04:01

## 2023-01-10 RX ADMIN — ENOXAPARIN SODIUM 80 MG: 100 INJECTION SUBCUTANEOUS at 04:01

## 2023-01-10 RX ADMIN — METOCLOPRAMIDE 5 MG: 5 INJECTION, SOLUTION INTRAMUSCULAR; INTRAVENOUS at 09:01

## 2023-01-10 RX ADMIN — ENOXAPARIN SODIUM 80 MG: 100 INJECTION SUBCUTANEOUS at 05:01

## 2023-01-10 RX ADMIN — DIPHENHYDRAMINE HYDROCHLORIDE 12.5 MG: 50 INJECTION, SOLUTION INTRAMUSCULAR; INTRAVENOUS at 09:01

## 2023-01-10 RX ADMIN — ACETAMINOPHEN 650 MG: 325 TABLET ORAL at 08:01

## 2023-01-10 RX ADMIN — ACETAMINOPHEN 650 MG: 325 TABLET ORAL at 02:01

## 2023-01-10 RX ADMIN — SODIUM CHLORIDE: 0.9 INJECTION, SOLUTION INTRAVENOUS at 04:01

## 2023-01-10 RX ADMIN — SODIUM CHLORIDE: 0.9 INJECTION, SOLUTION INTRAVENOUS at 05:01

## 2023-01-10 RX ADMIN — ATORVASTATIN CALCIUM 40 MG: 40 TABLET, FILM COATED ORAL at 08:01

## 2023-01-10 RX ADMIN — ASPIRIN 81 MG: 81 TABLET, COATED ORAL at 08:01

## 2023-01-10 RX ADMIN — IPRATROPIUM BROMIDE 1 SPRAY: 21 SPRAY, METERED NASAL at 09:01

## 2023-01-10 NOTE — CONSULTS
[facility]  Hematology/Oncology  Consult Note    Patient Name: Shereen Walker  MRN: [id number]  Admission Date: 1/8/2023  Hospital Length of Stay: 0 days  Code Status: Full Code   Attending Provider: Walt Rosario MD  Consulting Provider: Kwasi Bryson MD  Primary Care Physician: Aleida Yip MD  Principal Problem:Dural venous sinus thrombosis    Consults  Subjective:     HPI:  Ms. Walker is a 26yo female who is admitted with 2 weeks of congestion and progressively worsening head aches that evolved to 3 episodes of right arm numbness was admitted today after CNS imaging showed cerebral venous thrombosis.  Patient has no past medical history of clotting problems nor does she have a FH of such.  She has been on birth control for the last year but no new medications o/w.  She has been placed on Lovenox and is doing ok at this time with no further neurologic issues.        Oncology Treatment Plan:   [No matching plan found]    Medications:  Continuous Infusions:   sodium chloride 0.9% 100 mL/hr at 01/10/23 1631     Scheduled Meds:   aspirin  81 mg Oral Daily    atorvastatin  40 mg Oral Daily    enoxparin  1 mg/kg Subcutaneous Q12H    ipratropium  1 spray Each Nostril BID     PRN Meds:acetaminophen, sodium chloride 0.9%     Review of patient's allergies indicates:   Allergen Reactions    Suprax [cefixime] Hives        Past Medical History:   Diagnosis Date    Herpes simplex virus (HSV) infection     HSV II     Past Surgical History:   Procedure Laterality Date    FINGER SURGERY Left 1995    TONSILLECTOMY, ADENOIDECTOMY, BILATERAL MYRINGOTOMY AND TUBES Bilateral 1996    WISDOM TOOTH EXTRACTION Bilateral 2010     Family History       Problem Relation (Age of Onset)    Diabetes Maternal Grandmother    Hypertension Maternal Grandmother    Kidney disease Maternal Grandmother          Tobacco Use    Smoking status: Never    Smokeless tobacco: Never   Substance and Sexual Activity     Alcohol use: No    Drug use: No    Sexual activity: Yes     Partners: Male     Birth control/protection: None       Review of Systems   Constitutional:  Negative for activity change, appetite change, diaphoresis, fatigue, fever and unexpected weight change.   HENT:  Negative for congestion and hearing loss.    Eyes:  Negative for visual disturbance.   Respiratory:  Negative for cough, chest tightness and shortness of breath.    Cardiovascular:  Negative for chest pain and leg swelling.   Gastrointestinal:  Negative for abdominal pain, blood in stool, diarrhea, nausea and vomiting.   Endocrine: Negative for cold intolerance and heat intolerance.   Genitourinary:  Negative for difficulty urinating, dysuria and hematuria.   Neurological:  Negative for dizziness and headaches.   Hematological:  Negative for adenopathy. Does not bruise/bleed easily.   Psychiatric/Behavioral:  Negative for behavioral problems.    Objective:     Vital Signs (Most Recent):  Temp: 98.7 °F (37.1 °C) (01/10/23 1523)  Pulse: 101 (01/10/23 1523)  Resp: 16 (01/10/23 1523)  BP: 110/65 (01/10/23 1523)  SpO2: 98 % (01/10/23 1523) Vital Signs (24h Range):  Temp:  [97.5 °F (36.4 °C)-98.7 °F (37.1 °C)] 98.7 °F (37.1 °C)  Pulse:  [] 101  Resp:  [16-20] 16  SpO2:  [97 %-99 %] 98 %  BP: (110-126)/(65-77) 110/65     Weight: 83.7 kg (184 lb 8.4 oz)  Body mass index is 32.69 kg/m².  Body surface area is 1.93 meters squared.      Intake/Output Summary (Last 24 hours) at 1/10/2023 1726  Last data filed at 1/10/2023 0848  Gross per 24 hour   Intake 240 ml   Output --   Net 240 ml       Physical Exam  Constitutional:       Appearance: She is well-developed.   HENT:      Head: Normocephalic and atraumatic.      Right Ear: External ear normal.      Left Ear: External ear normal.   Eyes:      Conjunctiva/sclera: Conjunctivae normal.      Pupils: Pupils are equal, round, and reactive to light.   Neck:      Thyroid: No thyromegaly.      Trachea: No tracheal  deviation.   Cardiovascular:      Rate and Rhythm: Normal rate and regular rhythm.      Heart sounds: Normal heart sounds.   Pulmonary:      Effort: Pulmonary effort is normal.      Breath sounds: Normal breath sounds.   Abdominal:      General: Bowel sounds are normal. There is no distension.      Palpations: Abdomen is soft. There is no mass.      Tenderness: There is no abdominal tenderness.   Skin:     Findings: No rash.   Neurological:      Comments: Neuro intact througout   Psychiatric:         Behavior: Behavior normal.         Thought Content: Thought content normal.         Judgment: Judgment normal.       Significant Labs:   CBC:   Recent Labs   Lab 01/09/23 0433 01/10/23  0324   WBC 7.95 7.35   HGB 13.9 13.9   HCT 41.4 42.3    315    and CMP:   Recent Labs   Lab 01/09/23 0432 01/10/23  0323    139   K 3.8 3.8   CL 99 108   CO2 23 26   GLU 97 90   BUN 7 9   CREATININE 0.5 0.5   CALCIUM 8.9 9.1   PROT 7.0 6.9   ALBUMIN 3.7 3.6   BILITOT 0.4 0.4   ALKPHOS 48* 53*   AST 12 12   ALT 8* 8*   ANIONGAP 15 5*       Diagnostic Results:  None    Assessment/Plan:     * Dural venous sinus thrombosis  I had a long discussion with Ms. Walker about this new diagnosis and the process moving forward.  She will need to be on anticoagulation and I feel this may be long-term given this odd and rare occurrence.  She is on Lovenox now and will transition her to oral DOAC prior to discharge.  I will get a hypercoag work up upon follow up as an OP and I discussed this with her as well.  I see no clear signs or symptoms of an underlying malignancy but will get CT chest/a/p prior to d/c to be sure of this.  Will continue to follow.         Thank you for your consult. I will follow-up with patient. Please contact us if you have any additional questions.    Kwasi Bryson MD  Hematology/Oncology  [facility]

## 2023-01-10 NOTE — PROGRESS NOTES
[facility]  Department of Neurology  Neurology Progress Note        PATIENT NAME: Shereen Walker  MRN: [id number]  CSN: [id number]      TODAY'S DATE: 01/10/2023  ADMIT DATE: 1/8/2023                            CONSULTING PROVIDER: Andrey Angeles MD  CONSULT REQUESTED BY: Walt Rosario MD      Reason for consult: Headache, weakness and numbness       History obtained from chart review and the patient.    HPI per EMR: Shereen Walker is a 27 y.o. female presents with intermittent left arm heaviness and numbness patient reports this has been happening for a few minutes daily for the past 3 days patient describes a headache that occurs after these symptoms patient reports 2 episodes on Friday while episode on Saturday and 1 episode starting at approximately 10:30am a.m. today and resolving at approximately 11:00 a.m..  Patient reports this is associated with a feeling of heaviness in her left arm and loss of fine motor coordination patient reports that each time it involved her left arm patient reports that these episodes last about 20-30 minutes and then her arm returns to normal she also reports that there is a feeling of numbness to the left side of her face and the left side of her tongue patient reports that afterwards she notices a headache that develops.  Patient reports no current symptoms right now.  Patient has no significant past medical history patient has no other acute complaints at this time.       Neurology consult:  Patient was seen examined by me.  She states that over the last 2 weeks, she has been having a pressure like headache mostly on the right side radiating from the back of the head to the front.  She has associated photophobia however no nausea or vomiting.  She denies any positional or diurnal variation to the headache.  She denies any vision changes associated with the headache.  For the past 1 week, she has been having intermittent weakness/heaviness and numbness in her  right and left upper and lower extremities which is fluctuating.  She states that her headache has almost been persistent for the past 1 week and felt like a sinus pressure.    She denies any history of migraines or headaches in the past.  Denies any blood clots.  She endorses of 1 spontaneous miscarriage (1st trimester).  She denies any other significant past medical history.    1/10/2023: Patient was seen and examined by me this morning.  No acute overnight events, no new neurological complaints.  Vitals have been stable. Complains of a dull headache this morning which is improving with Tylenol.  She also complains of intermittent floaters in her vision.    PREVIOUS MEDICAL HISTORY:  Past Medical History:   Diagnosis Date    Herpes simplex virus (HSV) infection     HSV II     PREVIOUS SURGICAL HISTORY:  Past Surgical History:   Procedure Laterality Date    FINGER SURGERY Left 1995    TONSILLECTOMY, ADENOIDECTOMY, BILATERAL MYRINGOTOMY AND TUBES Bilateral 1996    WISDOM TOOTH EXTRACTION Bilateral 2010     FAMILY MEDICAL HISTORY:  Family History   Problem Relation Age of Onset    Hypertension Maternal Grandmother     Diabetes Maternal Grandmother     Kidney disease Maternal Grandmother     Arrhythmia Neg Hx     Cardiomyopathy Neg Hx     Congenital heart disease Neg Hx     Heart attacks under age 50 Neg Hx     Pacemaker/defibrilator Neg Hx      SOCIAL HISTORY:  Social History     Tobacco Use    Smoking status: Never    Smokeless tobacco: Never   Substance Use Topics    Alcohol use: No    Drug use: No     ALLERGIES:  Review of patient's allergies indicates:   Allergen Reactions    Suprax [cefixime] Hives     HOME MEDICATIONS:  Prior to Admission medications    Medication Sig Start Date End Date Taking? Authorizing Provider   amoxicillin-clavulanate 875-125mg (AUGMENTIN) 875-125 mg per tablet Take 1 tablet by mouth every 12 (twelve) hours. 12/31/22  Yes Historical Provider   etonogestreL-ethinyl estradioL (NUVARING)  0.12-0.015 mg/24 hr vaginal ring Place 1 each vaginally every 28 days. 12/20/22  Yes Historical Provider   ipratropium (ATROVENT) 21 mcg (0.03 %) nasal spray 1 spray by Each Nostril route 2 (two) times daily. 12/31/22  Yes Historical Provider   brompheniramine-pseudoeph-DM (BROMFED DM) 2-30-10 mg/5 mL Syrp Take 10 mLs by mouth 4 (four) times daily as needed. 12/31/22   Historical Provider     CURRENT SCHEDULED MEDICATIONS:   aspirin  81 mg Oral Daily    atorvastatin  40 mg Oral Daily    enoxparin  1 mg/kg Subcutaneous Q12H    ipratropium  1 spray Each Nostril BID     CURRENT INFUSIONS:   sodium chloride 0.9% 100 mL/hr at 01/10/23 0526     CURRENT PRN MEDICATIONS:  acetaminophen, sodium chloride 0.9%    REVIEW OF SYSTEMS:  Please refer to the HPI for all pertinent positive and negative findings. A comprehensive review of all other systems was negative.       PHYSICAL EXAM:  Patient Vitals for the past 24 hrs:   BP Temp Temp src Pulse Resp SpO2 Weight   01/10/23 0714 124/66 97.7 °F (36.5 °C) Oral 91 18 97 % --   01/10/23 0435 122/74 97.9 °F (36.6 °C) Oral 77 18 97 % 83.7 kg (184 lb 8.4 oz)   01/09/23 2338 114/66 97.5 °F (36.4 °C) Oral 73 17 97 % --   01/09/23 2000 126/77 98.2 °F (36.8 °C) -- 81 16 99 % --   01/09/23 1500 (!) 142/65 98.3 °F (36.8 °C) Oral 105 17 97 % --   01/09/23 1100 133/84 98.3 °F (36.8 °C) Oral 88 16 97 % --       GENERAL APPEARANCE: Alert, well-developed, well-nourished female in no acute distress.  HEENT: Normocephalic and atraumatic. PERRL. Oropharynx unremarkable.  PULM: Normal respiratory effort. No accessory muscle use.  CV: RRR.  ABDOMEN: Soft, nontender.  EXTREMITIES: No obvious signs of vascular compromise. Pulses present. No cyanosis, clubbing or edema.  SKIN: Clear; no rashes, lesions or skin breaks in exposed areas.    NEURO:  MENTAL STATUS:   , Patient awake and oriented to time, place, and person, recent/remote memory normal, attention span/concentration normal, speech fluent without  paraphasic errors, good comprehension with appropriate thought content, and fund of knowledge appropriate for patient's level of education.  Affect euthymic.    CRANIAL NERVES:  CN I: Not tested.  CN II: Fundoscopic exam deferred.  CN III, IV, VI: Pupils equal, round and reactive to light.  Extraocular movements full and intact.  CN V: Facial sensation normal.  CN VII: Facial asymmetry absent.  CN VIII: Hearing grossly normal and equal bilaterally.  No skew deviation or pathologic nystagmus.  CN IX, X: Palate elevates symmetrically. Speech/articulation is clear without dysarthria.  CN XI: Shoulder shrug and chin rotation equal with good strength.  CN XII: Tongue protrusion midline.    MOTOR:  Bulk normal. Tone normal and symmetric throughout.  Abnormal movements absent.  Tremor: none present.  Strength 5/5 throughout.    REFLEXES:  DTRs 2+ throughout.  Plantar response downgoing bilaterally.  SENSATION: grossly intact throughout.  COORDINATION: normal finger-to-nose.  STATION: not tested.  GAIT: not tested.      NIHSS:  1a      Level of Consciousness (alert, drowsy, etc.):   0=alert; keenly responsive  1b.     Level of Consciousness Questions (month, age): 0= able to answer both questions  1c.     Level of Consciousness Commands (open, close eyes, make fist, let go):  0=Answers both tasks correctly  2.      Best Gaze (eyes open - patient follows examiner's finger or face):      0=normal  3.      Visual (introduce visual stimulus/threat to patient's visual field quadrants):  0=No visual loss  4.      Facial Palsy (show teeth, raise eyebrows and squeeze eyes shut):        0=Normal symmetric movement  5a.     Motor Arm - Left (elevate extremity 90 degrees and score drift/movement):       0=No drift, limb holds 90 (or 45) degrees for full 10 seconds  5b.     Motor Arm - Right (elevate extremity 90 degrees and score drift/movement):      0=No drift, limb holds 90 (or 45) degrees for full 10 seconds  6a.     Motor Leg -  Left (elevate extremity 30 degrees and score drift/movement):       0=No drift, limb holds 90 (or 45) degrees for full 10 seconds  6b      Motor Leg - Right (elevate extremity 30 degrees and score drift/movement):      0=No drift, limb holds 90 (or 45) degrees for full 10 seconds  7.      Limb Ataxia (finger-nose, heel down shin):      0=Absent  8.      Sensory (pin prick to face, arm, trunk, and leg - compare side to side):        0=Normal; no sensory loss  9.      Best Language (name items, describe a picture and read sentences):      0=No aphasia, normal  10.     Dysarthria (evaluate speech clarity by patient repeating listed words): 0=Normal  11.     Extinction and Inattention (use prior testing to identify neglect or double simultaneous stimuli testing):      0=No abnormality          NIH Stroke Scale Total:         0      Labs:  Recent Labs   Lab 01/08/23  1254 01/09/23  0432 01/10/23  0323    137 139   K 4.1 3.8 3.8    99 108   CO2 25 23 26   BUN 10 7 9   CREATININE 0.4* 0.5 0.5   GLU 93 97 90   CALCIUM 9.2 8.9 9.1   PHOS  --  3.5  --    MG  --  1.9  --      Recent Labs   Lab 01/08/23  1254 01/09/23  0433 01/10/23  0324   WBC 7.40 7.95 7.35   HGB 14.9 13.9 13.9   HCT 44.2 41.4 42.3    312 315     Recent Labs   Lab 01/08/23  1254 01/09/23  0432 01/10/23  0323   ALBUMIN 4.2 3.7 3.6   PROT 7.6 7.0 6.9   BILITOT 0.6 0.4 0.4   ALKPHOS 49* 48* 53*   ALT 9* 8* 8*   AST 14 12 12     Lab Results   Component Value Date    INR 1.0 01/09/2023     Lab Results   Component Value Date    TRIG 81 01/08/2023    HDL 69 01/08/2023    CHOLHDL 45.7 01/08/2023     Lab Results   Component Value Date    HGBA1C 5.3 01/09/2023     No results found for: PROTEINCSF, GLUCCSF, WBCCSF    Imaging:  I have reviewed and interpreted the pertinent imaging and lab results.      CTA Head  The study was performed with thin sections with subsequent 3-D reformations  and reconstructions at multiple planes with images permanently  stored in the PACS system.    All CT scans at this facility use dose modulation, degenerative reconstruction  and/or weight-based dosing when appropriate to reduce radiation dose to as low as reasonably achievable.    CT venogram of the brain    HISTORY: Dural venous sinus thrombosis.    The examination utilizes 100 mL of Omnipaque 350.    In keeping with the findings on preceding MRI, there is thrombosis of the majority of the superior sagittal sinus with sparing of the far anterior aspect of the sinus. Thrombus extends to the level of the torcula. There is partially occlusive thrombus within the right transverse sinus and a portion of the right sigmoid sinus. There is thrombosis within the right jugular bulb which extends into the visualized portion of the right internal jugular vein.    The remainder of the dural venous sinuses enhance appropriately.    IMPRESSION:    Dural venous sinus thrombosis as detailed with extension to involve the right jugular bulb and visualized segment of the right internal jugular vein.    Electronically signed by:  Alberta Albert MD  1/9/2023 3:47 PM CST Workstation: [id number]  MRI Brain W WO Contrast  MRI of the brain with and without contrast    HISTORY: Headaches, dizziness.    Multiplanar pre and postcontrast imaging are performed before and following intravenous administration of 10 mL Gadavist.    There is abnormal signal demonstrated throughout the majority of the superior sagittal sinus which extends into the right transverse sinus and involves a portion of the right sigmoid sinus and jugular bulb. Additionally, there is abnormal signal within several of the cortical veins in the parietal region bilaterally and right frontal region. There is absence of an appropriate flow void and apparent filling defect on postcontrast imaging. The findings are compatible with dural venous sinus thrombosis with extension into some of the parietal cortical veins.    There are couple of tiny  nonspecific foci of white matter hyperintensity observed subcortically in the parietal lobes. There are no findings of acute hemorrhage or infarction. There is no evidence of intra-axial mass or significant mass effect. There is no midline shift.    The ventricular system is appropriate in size and position for the patient's age. There are no extra-axial collections.    There is mild scattered mucosal thickening within the paranasal sinuses.    The skull base and craniocervical junction appear unremarkable.    IMPRESSION:    Findings compatible with dural venous sinus thrombosis as detailed above. There is also involvement of some of the superficial cortical veins. These findings were telephoned to Dr. Desai at 1:00 PM.    Small nonspecific subcortical foci of white matter hyperintensity within the parietal lobes bilaterally.    Electronically signed by:  Alberta Albert MD  1/9/2023 1:17 PM CST Workstation: [id number]  Echo Saline Bubble? Yes  · The left ventricle is normal in size with normal systolic function.  · Normal left ventricular diastolic function.  · The estimated PA systolic pressure is 30 mmHg.  · Normal right ventricular size with normal right ventricular systolic   function.  · Normal central venous pressure (3 mmHg).  · Mild tricuspid regurgitation.  · The estimated ejection fraction is 65%.  · Mild left atrial enlargement.  · There is no evidence of intracardiac shunting.            ASSESSMENT & PLAN:    Cerebral venous sinus thrombosis  Headache  Weakness and numbness    Plan  Patient presented with persistent pressure-like headache mostly on the right side and intermittent fluctuating weakness and numbness in her extremities.  MRI brain showed concern for superior sagittal sinus, right transverse sinus abnormality concerning for cerebral sinus thrombosis. MRI brain was negative for stroke or ICH.   CT venogram head confirm above MRI findings of cerebral venous sinus thrombosis  Patient was started  on Lovenox 1 milligram/kilogram b.i.d.  Hematology has seen the patient.  Pending recommendations regarding oral anticoagulation  Will need to check hypercoagulable panel  Recommend stopping the Nuvaring and consider progesterone only birth control prevent hypercoagulability.  PT OT  IV fluids with normal saline  Tylenol PRN for headache.  Currently headache is better.  If headache recurs or worsens, recommend headache cocktail with Benadryl, Reglan every 8 hours.  Will follow            Thank you kindly for including us in the care of this patient. Please do not hesitate to contact us with any questions.        45 minutes of care time has been spent evaluating with the patient. Time includes chart review not limited to diagnostic imaging, labs, and vitals, patient assessment, discussion with family and nursing, current order evaluations, and new order entries.       Andrey Angeles MD  Neurology/vascular Neurology  Date of Service: 01/10/2023  10:14 AM    --------------------------------------------------------------------------------------------------------------------------------------------------------------------------------------------------------------------------------------------------------------  Please note: This note was transcribed using voice recognition software. Because of this technology there are often uinintended grammatical, spelling, and other transcription errors. Please disregard these errors.

## 2023-01-10 NOTE — HPI
Ms. Walker is feeling ok today. No new complaints.  Still has some head pressure but no further neuropathy episodes overnight.

## 2023-01-10 NOTE — PROGRESS NOTES
[facility] Medicine  Progress Note    Patient name: Shereen Walker  MRN: [id number]  Admit Date: 1/8/2023   LOS: 0 days     SUBJECTIVE:     Principal problem: Dural venous sinus thrombosis  Chief Complaint   Patient presents with    Headache     X 2 WEEKS    Neck Pain    Facial Pain       Interval History:  Patient admitted with venous sinus thrombosis yesterday, started on Lovenox.  This morning she has had some tunnel vision symptoms and tingling over the left side of her body.  She denies any focal weakness or focal vision deficits.  No other neurological changes.  Will need workup for hypercoagulability.  Pending Hematology consultation and recommendations.    Scheduled Meds:   aspirin  81 mg Oral Daily    atorvastatin  40 mg Oral Daily    enoxparin  1 mg/kg Subcutaneous Q12H    ipratropium  1 spray Each Nostril BID     Continuous Infusions:   sodium chloride 0.9% 100 mL/hr at 01/10/23 0526     PRN Meds:acetaminophen, sodium chloride 0.9%    Review of patient's allergies indicates:   Allergen Reactions    Suprax [cefixime] Hives       Review of Systems: As per interval history    OBJECTIVE:     Vital Signs (Most Recent)  Temp: 98.2 °F (36.8 °C) (01/10/23 1216)  Pulse: 73 (01/10/23 1216)  Resp: 20 (01/10/23 1216)  BP: 122/77 (01/10/23 1216)  SpO2: 97 % (01/10/23 1216)    Vital Signs Range (Last 24H):  Temp:  [97.5 °F (36.4 °C)-98.3 °F (36.8 °C)]   Pulse:  []   Resp:  [16-20]   BP: (114-142)/(65-77)   SpO2:  [97 %-99 %]     I & O (Last 24H):  Intake/Output Summary (Last 24 hours) at 1/10/2023 1413  Last data filed at 1/10/2023 0848  Gross per 24 hour   Intake 240 ml   Output --   Net 240 ml       Physical Exam:  General: Patient resting comfortably in no acute distress. Appears as stated age. Calm  Eyes: No conjunctival injection. No scleral icterus.  ENT: Hearing grossly intact. No discharge from ears. No nasal discharge.   Neck: Supple, trachea midline. No JVD  CVS: RRR. No LE edema  BL  Lungs: CTA BL, no wheezing or crackles. Good breath sounds. No accessory muscle use. No acute respiratory distress  Abdomen:  Soft, nontender and nondistended.  No organomegaly  Neuro: AOx3. Moves all extremities. Follows commands. Responds appropriately   Skin:  No rash or erythema noted    Laboratory:  I have reviewed all pertinent lab results within the past 24 hours.    Diagnostic Results:       ASSESSMENT/PLAN:     27-year-old female here with Dural venous sinus thrombosis    Active Hospital Problems    Diagnosis  POA    *Dural venous sinus thrombosis [G08]  Yes    Numbness [R20.0]  Yes    Headache [R51.9]  Yes      Resolved Hospital Problems   No resolved problems to display.         Plan:   - continue Lovenox  - intermittent neurological symptoms  - will need hypercoagulability workup likely as an outpatient once acute thrombosis resolved  - iron deficiency workup  - appreciate neurology and Hematology recommendations      VTE Risk Mitigation (From admission, onward)           Ordered     enoxaparin injection 80 mg  Every 12 hours (non-standard times)         01/09/23 1613     IP VTE HIGH RISK PATIENT  Once         01/08/23 1640     Place sequential compression device  Until discontinued         01/08/23 1640                      Department Hospital Medicine  [facility]  Walt Rsoario MD  Date of service: 01/10/2023

## 2023-01-10 NOTE — SUBJECTIVE & OBJECTIVE
Oncology Treatment Plan:   [No matching plan found]    Medications:  Continuous Infusions:   sodium chloride 0.9% 100 mL/hr at 01/10/23 1631     Scheduled Meds:   aspirin  81 mg Oral Daily    atorvastatin  40 mg Oral Daily    enoxparin  1 mg/kg Subcutaneous Q12H    ipratropium  1 spray Each Nostril BID     PRN Meds:acetaminophen, sodium chloride 0.9%     Review of patient's allergies indicates:   Allergen Reactions    Suprax [cefixime] Hives        Past Medical History:   Diagnosis Date    Herpes simplex virus (HSV) infection     HSV II     Past Surgical History:   Procedure Laterality Date    FINGER SURGERY Left 1995    TONSILLECTOMY, ADENOIDECTOMY, BILATERAL MYRINGOTOMY AND TUBES Bilateral 1996    WISDOM TOOTH EXTRACTION Bilateral 2010     Family History       Problem Relation (Age of Onset)    Diabetes Maternal Grandmother    Hypertension Maternal Grandmother    Kidney disease Maternal Grandmother          Tobacco Use    Smoking status: Never    Smokeless tobacco: Never   Substance and Sexual Activity    Alcohol use: No    Drug use: No    Sexual activity: Yes     Partners: Male     Birth control/protection: None       Review of Systems   Constitutional:  Negative for activity change, appetite change, diaphoresis, fatigue, fever and unexpected weight change.   HENT:  Negative for congestion and hearing loss.    Eyes:  Negative for visual disturbance.   Respiratory:  Negative for cough, chest tightness and shortness of breath.    Cardiovascular:  Negative for chest pain and leg swelling.   Gastrointestinal:  Negative for abdominal pain, blood in stool, diarrhea, nausea and vomiting.   Endocrine: Negative for cold intolerance and heat intolerance.   Genitourinary:  Negative for difficulty urinating, dysuria and hematuria.   Neurological:  Negative for dizziness and headaches.   Hematological:  Negative for adenopathy. Does not bruise/bleed easily.   Psychiatric/Behavioral:  Negative for behavioral problems.     Objective:     Vital Signs (Most Recent):  Temp: 98.7 °F (37.1 °C) (01/10/23 1523)  Pulse: 101 (01/10/23 1523)  Resp: 16 (01/10/23 1523)  BP: 110/65 (01/10/23 1523)  SpO2: 98 % (01/10/23 1523) Vital Signs (24h Range):  Temp:  [97.5 °F (36.4 °C)-98.7 °F (37.1 °C)] 98.7 °F (37.1 °C)  Pulse:  [] 101  Resp:  [16-20] 16  SpO2:  [97 %-99 %] 98 %  BP: (110-126)/(65-77) 110/65     Weight: 83.7 kg (184 lb 8.4 oz)  Body mass index is 32.69 kg/m².  Body surface area is 1.93 meters squared.      Intake/Output Summary (Last 24 hours) at 1/10/2023 1726  Last data filed at 1/10/2023 0848  Gross per 24 hour   Intake 240 ml   Output --   Net 240 ml       Physical Exam  Constitutional:       Appearance: She is well-developed.   HENT:      Head: Normocephalic and atraumatic.      Right Ear: External ear normal.      Left Ear: External ear normal.   Eyes:      Conjunctiva/sclera: Conjunctivae normal.      Pupils: Pupils are equal, round, and reactive to light.   Neck:      Thyroid: No thyromegaly.      Trachea: No tracheal deviation.   Cardiovascular:      Rate and Rhythm: Normal rate and regular rhythm.      Heart sounds: Normal heart sounds.   Pulmonary:      Effort: Pulmonary effort is normal.      Breath sounds: Normal breath sounds.   Abdominal:      General: Bowel sounds are normal. There is no distension.      Palpations: Abdomen is soft. There is no mass.      Tenderness: There is no abdominal tenderness.   Skin:     Findings: No rash.   Neurological:      Comments: Neuro intact througout   Psychiatric:         Behavior: Behavior normal.         Thought Content: Thought content normal.         Judgment: Judgment normal.       Significant Labs:   CBC:   Recent Labs   Lab 01/09/23  0433 01/10/23  0324   WBC 7.95 7.35   HGB 13.9 13.9   HCT 41.4 42.3    315    and CMP:   Recent Labs   Lab 01/09/23  0432 01/10/23  0323    139   K 3.8 3.8   CL 99 108   CO2 23 26   GLU 97 90   BUN 7 9   CREATININE 0.5 0.5    CALCIUM 8.9 9.1   PROT 7.0 6.9   ALBUMIN 3.7 3.6   BILITOT 0.4 0.4   ALKPHOS 48* 53*   AST 12 12   ALT 8* 8*   ANIONGAP 15 5*       Diagnostic Results:  None

## 2023-01-11 VITALS
RESPIRATION RATE: 20 BRPM | BODY MASS INDEX: 32.54 KG/M2 | OXYGEN SATURATION: 96 % | TEMPERATURE: 99 F | HEIGHT: 63 IN | DIASTOLIC BLOOD PRESSURE: 79 MMHG | HEART RATE: 70 BPM | SYSTOLIC BLOOD PRESSURE: 129 MMHG | WEIGHT: 183.63 LBS

## 2023-01-11 DIAGNOSIS — G08 ACUTE CEREBRAL VENOUS SINUS THROMBOSIS: Primary | ICD-10-CM

## 2023-01-11 PROCEDURE — 25000003 PHARM REV CODE 250: Performed by: STUDENT IN AN ORGANIZED HEALTH CARE EDUCATION/TRAINING PROGRAM

## 2023-01-11 PROCEDURE — 99232 SBSQ HOSP IP/OBS MODERATE 35: CPT | Mod: ,,, | Performed by: INTERNAL MEDICINE

## 2023-01-11 PROCEDURE — 25500020 PHARM REV CODE 255: Performed by: STUDENT IN AN ORGANIZED HEALTH CARE EDUCATION/TRAINING PROGRAM

## 2023-01-11 PROCEDURE — 25000003 PHARM REV CODE 250: Performed by: INTERNAL MEDICINE

## 2023-01-11 PROCEDURE — 63600175 PHARM REV CODE 636 W HCPCS: Performed by: INTERNAL MEDICINE

## 2023-01-11 PROCEDURE — 99232 PR SUBSEQUENT HOSPITAL CARE,LEVL II: ICD-10-PCS | Mod: ,,, | Performed by: INTERNAL MEDICINE

## 2023-01-11 RX ORDER — LANOLIN ALCOHOL/MO/W.PET/CERES
1000 CREAM (GRAM) TOPICAL DAILY
Status: DISCONTINUED | OUTPATIENT
Start: 2023-01-11 | End: 2023-01-11 | Stop reason: HOSPADM

## 2023-01-11 RX ORDER — KETOROLAC TROMETHAMINE 30 MG/ML
15 INJECTION, SOLUTION INTRAMUSCULAR; INTRAVENOUS EVERY 6 HOURS PRN
Status: DISCONTINUED | OUTPATIENT
Start: 2023-01-11 | End: 2023-01-11 | Stop reason: HOSPADM

## 2023-01-11 RX ORDER — FERROUS SULFATE 325(65) MG
325 TABLET, DELAYED RELEASE (ENTERIC COATED) ORAL DAILY
Qty: 30 TABLET | Refills: 0 | Status: SHIPPED | OUTPATIENT
Start: 2023-01-11 | End: 2023-02-10

## 2023-01-11 RX ORDER — LANOLIN ALCOHOL/MO/W.PET/CERES
1000 CREAM (GRAM) TOPICAL DAILY
Qty: 30 TABLET | Refills: 0 | Status: SHIPPED | OUTPATIENT
Start: 2023-01-12 | End: 2023-02-11

## 2023-01-11 RX ORDER — LANOLIN ALCOHOL/MO/W.PET/CERES
1 CREAM (GRAM) TOPICAL DAILY
Status: DISCONTINUED | OUTPATIENT
Start: 2023-01-11 | End: 2023-01-11 | Stop reason: HOSPADM

## 2023-01-11 RX ADMIN — ATORVASTATIN CALCIUM 40 MG: 40 TABLET, FILM COATED ORAL at 09:01

## 2023-01-11 RX ADMIN — IOHEXOL 100 ML: 350 INJECTION, SOLUTION INTRAVENOUS at 08:01

## 2023-01-11 RX ADMIN — ASPIRIN 81 MG: 81 TABLET, COATED ORAL at 09:01

## 2023-01-11 RX ADMIN — CYANOCOBALAMIN TAB 1000 MCG 1000 MCG: 1000 TAB at 09:01

## 2023-01-11 RX ADMIN — ACETAMINOPHEN 650 MG: 325 TABLET ORAL at 06:01

## 2023-01-11 RX ADMIN — FERROUS SULFATE TAB 325 MG (65 MG ELEMENTAL FE) 1 EACH: 325 (65 FE) TAB at 09:01

## 2023-01-11 RX ADMIN — ENOXAPARIN SODIUM 80 MG: 100 INJECTION SUBCUTANEOUS at 05:01

## 2023-01-11 RX ADMIN — APIXABAN 10 MG: 5 TABLET, FILM COATED ORAL at 09:01

## 2023-01-11 NOTE — PROGRESS NOTES
[facility]  Hematology/Oncology  Progress Note    Patient Name: Shereen Walker  Admission Date: 1/8/2023  Hospital Length of Stay: 1 days  Code Status: Full Code     Subjective:     HPI:  Ms. Walker is feeling ok today. No new complaints.  Still has some head pressure but no further neuropathy episodes overnight.        Interval History:     Oncology Treatment Plan:   [No matching plan found]    Medications:  Continuous Infusions:   sodium chloride 0.9% 100 mL/hr at 01/10/23 1631     Scheduled Meds:   apixaban  10 mg Oral BID    aspirin  81 mg Oral Daily    atorvastatin  40 mg Oral Daily    cyanocobalamin  1,000 mcg Oral Daily    ferrous sulfate  1 tablet Oral Daily    ipratropium  1 spray Each Nostril BID     PRN Meds:acetaminophen, ketorolac, sodium chloride 0.9%     Review of Systems   Constitutional:  Negative for fever.   Respiratory:  Negative for shortness of breath.    Cardiovascular:  Negative for chest pain and leg swelling.   Gastrointestinal:  Negative for abdominal pain and blood in stool.   Genitourinary:  Negative for hematuria.   Skin:  Negative for rash.   Objective:     Vital Signs (Most Recent):  Temp: 98.5 °F (36.9 °C) (01/11/23 0759)  Pulse: 70 (01/11/23 0759)  Resp: 20 (01/11/23 0759)  BP: 129/79 (01/11/23 0759)  SpO2: 96 % (01/11/23 0759) Vital Signs (24h Range):  Temp:  [97.5 °F (36.4 °C)-98.9 °F (37.2 °C)] 98.5 °F (36.9 °C)  Pulse:  [] 70  Resp:  [16-20] 20  SpO2:  [96 %-98 %] 96 %  BP: (108-129)/(65-85) 129/79     Weight: 83.3 kg (183 lb 10.3 oz)  Body mass index is 32.53 kg/m².  Body surface area is 1.92 meters squared.    No intake or output data in the 24 hours ending 01/11/23 0957    Physical Exam  Constitutional:       Appearance: She is well-developed.   HENT:      Head: Normocephalic and atraumatic.      Right Ear: External ear normal.      Left Ear: External ear normal.   Eyes:      Conjunctiva/sclera: Conjunctivae normal.      Pupils: Pupils are equal,  round, and reactive to light.   Neck:      Thyroid: No thyromegaly.      Trachea: No tracheal deviation.   Cardiovascular:      Rate and Rhythm: Normal rate and regular rhythm.      Heart sounds: Normal heart sounds.   Pulmonary:      Effort: Pulmonary effort is normal.      Breath sounds: Normal breath sounds.   Abdominal:      General: Bowel sounds are normal. There is no distension.      Palpations: Abdomen is soft. There is no mass.      Tenderness: There is no abdominal tenderness.   Skin:     Findings: No rash.   Neurological:      Comments: Neuro intact througout   Psychiatric:         Behavior: Behavior normal.         Thought Content: Thought content normal.         Judgment: Judgment normal.       Significant Labs:   CBC:   Recent Labs   Lab 01/10/23  0324   WBC 7.35   HGB 13.9   HCT 42.3          Diagnostic Results:  None    Assessment/Plan:     * Dural venous sinus thrombosis  Patient appears stable this am.  Going for CT scans today.   Would transition to oral anticoagulants once primary team feels ok with this and will plan on seeing her as OP for further work up and ongoing care.  Discussed this again with her today.     Numbness  She has not had further episodes.  Will continue to monitor.         Thank you for your consult. I will follow-up with patient. Please contact us if you have any additional questions.     Kwasi Bryson MD  Hematology/Oncology  [facility]

## 2023-01-11 NOTE — DISCHARGE SUMMARY
[facility] Medicine  Discharge Summary      Patient Name: Shereen Walker  MRN: [id number]  DANTE: [id number]  Patient Class: IP- Inpatient  Admission Date: 1/8/2023  Hospital Length of Stay: 1 days  Discharge Date and Time:  01/11/2023 1:58 PM  Attending Physician: No att. providers found   Discharging Provider: Walt Rosario MD  Primary Care Provider: Aleida Yip MD    Primary Care Team: Networked reference to record PCT     HPI:   ED note  27-year-old female presents complaining of intermittent left arm heaviness and numbness patient reports this has been happening for a few minutes daily for the past 3 days patient describes a headache that occurs after these symptoms patient reports 2 episodes on Friday while episode on Saturday and 1 episode starting at approximately 10:30am a.m. today and resolving at approximately 11:00 a.m..  Patient reports this is associated with a feeling of heaviness in her left arm and loss of fine motor coordination patient reports that each time it involved her left arm patient reports that these episodes last about 20-30 minutes and then her arm returns to normal she also reports that there is a feeling of numbness to the left side of her face and the left side of her tongue patient reports that afterwards she notices a headache that develops.  Patient reports no current symptoms right now.  Patient has no significant past medical history patient has no other acute complaints at this time.      1/8/2022  Pt has no symptoms but has has 3 episodes of left sided numbness and a headache concurrently.           * No surgery found *      Hospital Course:   Patient is a 27-year-old female with no significant past medical history.  She presented with headaches and some neurological symptoms including tingling and vision changes.  She was found to have venous sinus thrombosis of her sagittal sinus, transverse sinus, right jugular bulb and right internal jugular  vein.  The patient uses estrogen containing birth control NuvaRing.  She has never had venous thrombosis in the past.  She has no significant family history of clotting disorder.  Neurology and hematology both evaluated the patient and she was started on anticoagulation.  Her headaches improved and her neurological symptoms improved over the course of her hospitalization.  She was transition to Eliquis which she will continue for anticoagulation.  She will follow-up with Dr. Bryson as an outpatient for hypercoagulability workup.  Of note, she is iron deficient and will start oral iron at discharge.  Additionally, she was found to be B12 deficient and will start supplementation with this as well.  CT scan of the chest abdomen pelvis was obtained to rule out underlying malignancy.  There was no obvious mass or malignancy but she does have a 10 mm left breast nodule.  Given that she will need to discontinue her current birth control and discuss alternative means of control, I have referred her back to her OB gyn for management of this as well as follow-up on her left breast nodule as she will likely need ultrasound and/or mammogram though Dr Bryson may also be able to arrange.  I reviewed the discharge plan of care instructions with the patient on the day of discharge.  She was discharged in good condition with plans for close outpatient follow-up with her providers.       Goals of Care Treatment Preferences:  Code Status: Full Code      Consults:   Consults (From admission, onward)        Status Ordering Provider     Inpatient consult to Hematology Oncology  Once        Provider:  Kwasi Bryson MD    Acknowledged HARSH FONTANA     IP consult to case management/social work  Once        Provider:  (Not yet assigned)    Completed RONALD RITCHIE     Inpatient consult to Internal Medicine  Once        Provider:  Ronald Ritchie MD    Acknowledged RONALD RITCHIE     Inpatient consult to Neurology  Once         Provider:  Dolly Burgess MD    Completed RONALD RIDLEY          Left arm numbness  Left face tongue and arm X 3 with associated headache  The symptoms have recurred X 3 currently asymptomatic with no headache  MRI will be available in the AM  Echo with bubble study  Neurology consulted      Final Active Diagnoses:    Diagnosis Date Noted POA    PRINCIPAL PROBLEM:  Dural venous sinus thrombosis [G08] 01/09/2023 Yes    Left arm numbness [R20.0] 01/08/2023 Yes    Headache [R51.9] 01/08/2023 Yes      Problems Resolved During this Admission:       Discharged Condition: good    Disposition: Home or Self Care    Follow Up:   Follow-up Information     Kwasi Bryson MD. Schedule an appointment as soon as possible for a visit in 2 week(s).    Specialties: Hematology, Oncology, Hematology and Oncology  Contact information:  [address]  [address]  [place] [address]  [phone]             Andrey Angeles MD. Schedule an appointment as soon as possible for a visit in 1 week(s).    Specialty: Neurology  Contact information:  [address]  [place] [address]  [phone]             Aleida Yip MD. Schedule an appointment as soon as possible for a visit in 2 week(s).    Specialties: Obstetrics, Obstetrics and Gynecology  Contact information:  [address]  [address]  [facility]  [place] [address]  [phone]             NENA MENDOZA NP Follow up.    Specialty: Nurse Practitioner  Why: 1/19/2023 at 11:00 with JUSTUS Mendoza  Contact information:  [address]  [place] [address]  [phone]                       Patient Instructions:      Diet Adult Regular     No dressing needed     Activity as tolerated       Significant Diagnostic Studies: Labs:   BMP:   Recent Labs   Lab 01/10/23  0323   GLU 90      K 3.8      CO2 26   BUN 9   CREATININE 0.5   CALCIUM 9.1   , CBC   Recent Labs   Lab 01/10/23  0324   WBC 7.35   HGB 13.9   HCT 42.3        and All labs within the past 24 hours have been reviewed    Pending Diagnostic Studies:     None         Medications:  Reconciled Home Medications:      Medication List      START taking these medications    apixaban 5 mg Tab  Commonly known as: ELIQUIS  Take 2 tablets (10 mg total) by mouth 2 (two) times daily for 7 days, THEN 1 tablet (5 mg total) 2 (two) times daily.  Start taking on: January 11, 2023     cyanocobalamin 1000 MCG tablet  Commonly known as: VITAMIN B-12  Take 1 tablet (1,000 mcg total) by mouth once daily.  Start taking on: January 12, 2023     ferrous sulfate 325 (65 FE) MG EC tablet  Take 1 tablet (325 mg total) by mouth once daily.        CONTINUE taking these medications    brompheniramine-pseudoeph-DM 2-30-10 mg/5 mL Syrp  Commonly known as: BROMFED DM  Take 10 mLs by mouth 4 (four) times daily as needed.     ipratropium 21 mcg (0.03 %) nasal spray  Commonly known as: ATROVENT  1 spray by Each Nostril route 2 (two) times daily.        STOP taking these medications    amoxicillin-clavulanate 875-125mg 875-125 mg per tablet  Commonly known as: AUGMENTIN     etonogestreL-ethinyl estradioL 0.12-0.015 mg/24 hr vaginal ring  Commonly known as: NUVARING            Indwelling Lines/Drains at time of discharge:   Lines/Drains/Airways     Epidural Line  Duration                Epidural Catheter 10/22/21 446 days                Time spent on the discharge of patient: 55 minutes         Walt Rosario MD  Department of Hospital Medicine  [facility]

## 2023-01-11 NOTE — PLAN OF CARE
DC orders and chart reviewed. No discharge needs noted.  Patient cleared for discharge from .  Patient discharging to home.  Email sent to Rula at [facility] to get patient established with a PCP.  Will update with follow up appointment information.      Follow up PCP 1/19/2023 at 11:00 with JUSTUS Mendoza     01/11/23 0556   Final Note   Assessment Type Final Discharge Note   Anticipated Discharge Disposition Home   What phone number can be called within the next 1-3 days to see how you are doing after discharge? [phone]   Hospital Resources/Appts/Education Provided Provided patient/caregiver with written discharge plan information

## 2023-01-11 NOTE — PLAN OF CARE
Problem: Adult Inpatient Plan of Care  Goal: Plan of Care Review  Outcome: Met  Goal: Patient-Specific Goal (Individualized)  Outcome: Met  Goal: Absence of Hospital-Acquired Illness or Injury  Outcome: Met  Goal: Optimal Comfort and Wellbeing  Outcome: Met  Goal: Readiness for Transition of Care  Outcome: Met     Problem: Adjustment to Illness (Stroke, Ischemic/Transient Ischemic Attack)  Goal: Optimal Coping  Outcome: Met     Problem: Bowel Elimination Impaired (Stroke, Ischemic/Transient Ischemic Attack)  Goal: Effective Bowel Elimination  Outcome: Met     Problem: Cerebral Tissue Perfusion (Stroke, Ischemic/Transient Ischemic Attack)  Goal: Optimal Cerebral Tissue Perfusion  Outcome: Met     Problem: Cognitive Impairment (Stroke, Ischemic/Transient Ischemic Attack)  Goal: Optimal Cognitive Function  Outcome: Met     Problem: Communication Impairment (Stroke, Ischemic/Transient Ischemic Attack)  Goal: Improved Communication Skills  Outcome: Met     Problem: Functional Ability Impaired (Stroke, Ischemic/Transient Ischemic Attack)  Goal: Optimal Functional Ability  Outcome: Met     Problem: Respiratory Compromise (Stroke, Ischemic/Transient Ischemic Attack)  Goal: Effective Oxygenation and Ventilation  Outcome: Met     Problem: Sensorimotor Impairment (Stroke, Ischemic/Transient Ischemic Attack)  Goal: Improved Sensorimotor Function  Outcome: Met     Problem: Swallowing Impairment (Stroke, Ischemic/Transient Ischemic Attack)  Goal: Optimal Eating and Swallowing without Aspiration  Outcome: Met     Problem: Urinary Elimination Impaired (Stroke, Ischemic/Transient Ischemic Attack)  Goal: Effective Urinary Elimination  Outcome: Met     Problem: Infection  Goal: Absence of Infection Signs and Symptoms  Outcome: Met     Problem: Fall Injury Risk  Goal: Absence of Fall and Fall-Related Injury  Outcome: Met

## 2023-01-11 NOTE — SUBJECTIVE & OBJECTIVE
Interval History:     Oncology Treatment Plan:   [No matching plan found]    Medications:  Continuous Infusions:   sodium chloride 0.9% 100 mL/hr at 01/10/23 1631     Scheduled Meds:   apixaban  10 mg Oral BID    aspirin  81 mg Oral Daily    atorvastatin  40 mg Oral Daily    cyanocobalamin  1,000 mcg Oral Daily    ferrous sulfate  1 tablet Oral Daily    ipratropium  1 spray Each Nostril BID     PRN Meds:acetaminophen, ketorolac, sodium chloride 0.9%     Review of Systems   Constitutional:  Negative for fever.   Respiratory:  Negative for shortness of breath.    Cardiovascular:  Negative for chest pain and leg swelling.   Gastrointestinal:  Negative for abdominal pain and blood in stool.   Genitourinary:  Negative for hematuria.   Skin:  Negative for rash.   Objective:     Vital Signs (Most Recent):  Temp: 98.5 °F (36.9 °C) (01/11/23 0759)  Pulse: 70 (01/11/23 0759)  Resp: 20 (01/11/23 0759)  BP: 129/79 (01/11/23 0759)  SpO2: 96 % (01/11/23 0759) Vital Signs (24h Range):  Temp:  [97.5 °F (36.4 °C)-98.9 °F (37.2 °C)] 98.5 °F (36.9 °C)  Pulse:  [] 70  Resp:  [16-20] 20  SpO2:  [96 %-98 %] 96 %  BP: (108-129)/(65-85) 129/79     Weight: 83.3 kg (183 lb 10.3 oz)  Body mass index is 32.53 kg/m².  Body surface area is 1.92 meters squared.    No intake or output data in the 24 hours ending 01/11/23 0957    Physical Exam  Constitutional:       Appearance: She is well-developed.   HENT:      Head: Normocephalic and atraumatic.      Right Ear: External ear normal.      Left Ear: External ear normal.   Eyes:      Conjunctiva/sclera: Conjunctivae normal.      Pupils: Pupils are equal, round, and reactive to light.   Neck:      Thyroid: No thyromegaly.      Trachea: No tracheal deviation.   Cardiovascular:      Rate and Rhythm: Normal rate and regular rhythm.      Heart sounds: Normal heart sounds.   Pulmonary:      Effort: Pulmonary effort is normal.      Breath sounds: Normal breath sounds.   Abdominal:      General: Bowel  sounds are normal. There is no distension.      Palpations: Abdomen is soft. There is no mass.      Tenderness: There is no abdominal tenderness.   Skin:     Findings: No rash.   Neurological:      Comments: Neuro intact througout   Psychiatric:         Behavior: Behavior normal.         Thought Content: Thought content normal.         Judgment: Judgment normal.       Significant Labs:   CBC:   Recent Labs   Lab 01/10/23  0324   WBC 7.35   HGB 13.9   HCT 42.3          Diagnostic Results:  None

## 2023-01-11 NOTE — ASSESSMENT & PLAN NOTE
Patient appears stable this am.  Going for CT scans today.   Would transition to oral anticoagulants once primary team feels ok with this and will plan on seeing her as OP for further work up and ongoing care.  Discussed this again with her today.

## 2023-01-11 NOTE — DISCHARGE SUMMARY
[facility] Medicine  Discharge Summary      Patient Name: Shereen Walker  MRN: [id number]  DANTE: [id number]  Patient Class: IP- Inpatient  Admission Date: 1/8/2023  Hospital Length of Stay: 1 days  Discharge Date and Time:  01/11/2023 2:00 PM  Attending Physician: No att. providers found   Discharging Provider: Walt Rosario MD  Primary Care Provider: Aleida Yip MD    Primary Care Team: Networked reference to record PCT     HPI:   ED note  27-year-old female presents complaining of intermittent left arm heaviness and numbness patient reports this has been happening for a few minutes daily for the past 3 days patient describes a headache that occurs after these symptoms patient reports 2 episodes on Friday while episode on Saturday and 1 episode starting at approximately 10:30am a.m. today and resolving at approximately 11:00 a.m..  Patient reports this is associated with a feeling of heaviness in her left arm and loss of fine motor coordination patient reports that each time it involved her left arm patient reports that these episodes last about 20-30 minutes and then her arm returns to normal she also reports that there is a feeling of numbness to the left side of her face and the left side of her tongue patient reports that afterwards she notices a headache that develops.  Patient reports no current symptoms right now.  Patient has no significant past medical history patient has no other acute complaints at this time.      1/8/2022  Pt has no symptoms but has has 3 episodes of left sided numbness and a headache concurrently.           * No surgery found *      Hospital Course:   Patient is a 27-year-old female with no significant past medical history.  She presented with headaches and some neurological symptoms including tingling and vision changes.  She was found to have venous sinus thrombosis of her sagittal sinus, transverse sinus, right jugular bulb and right internal jugular  vein.  The patient uses estrogen containing birth control NuvaRing.  She has never had venous thrombosis in the past.  She has no significant family history of clotting disorder.  Neurology and hematology both evaluated the patient and she was started on anticoagulation.  Her headaches improved and her neurological symptoms improved over the course of her hospitalization.  She was transition to Eliquis which she will continue for anticoagulation.  She will follow-up with Dr. Bryson as an outpatient for hypercoagulability workup.  Of note, she is iron deficient and will start oral iron at discharge.  Additionally, she was found to be B12 deficient and will start supplementation with this as well.  CT scan of the chest abdomen pelvis was obtained to rule out underlying malignancy.  There was no obvious mass or malignancy but she does have a 10 mm left breast nodule as well as a few small pulmonary nodules.  Given that she will need to discontinue her current birth control and discuss alternative means of control, I have referred her back to her OB gyn for management of this. She will follow up with Dr Bryson and can undergo mammogram for left breast nodule, and follow up CT if needed (though she would typically be considered low risk) for pulmonary nodules. I reviewed the discharge plan of care instructions with the patient on the day of discharge.  She was discharged in good condition with plans for close outpatient follow-up with her providers.       Goals of Care Treatment Preferences:  Code Status: Full Code      Consults:   Consults (From admission, onward)        Status Ordering Provider     Inpatient consult to Hematology Oncology  Once        Provider:  MD Tenisha Ellison MUHAMMAD F.     IP consult to case management/social work  Once        Provider:  (Not yet assigned)    RONALD Thorpe     Inpatient consult to Internal Medicine  Once        Provider:  Ronald CHARLES  MD Erma    Acknowledged RONALD RIDLEY     Inpatient consult to Neurology  Once        Provider:  Dolly Burgess MD    Completed RONALD RIDLEY          No new Assessment & Plan notes have been filed under this hospital service since the last note was generated.  Service: Hospital Medicine    Final Active Diagnoses:    Diagnosis Date Noted POA    PRINCIPAL PROBLEM:  Dural venous sinus thrombosis [G08] 01/09/2023 Yes    Left arm numbness [R20.0] 01/08/2023 Yes    Headache [R51.9] 01/08/2023 Yes      Problems Resolved During this Admission:       Discharged Condition: good    Disposition: Home or Self Care    Follow Up:   Follow-up Information     Kwasi Bryson MD. Schedule an appointment as soon as possible for a visit in 2 week(s).    Specialties: Hematology, Oncology, Hematology and Oncology  Contact information:  [address]  [address]  [place] [address]  [phone]             Andrey Angeles MD. Schedule an appointment as soon as possible for a visit in 1 week(s).    Specialty: Neurology  Contact information:  [address]  [place] [address]  [phone]             Aleida Yip MD. Schedule an appointment as soon as possible for a visit in 2 week(s).    Specialties: Obstetrics, Obstetrics and Gynecology  Contact information:  [address]  [address]  [facility]  [place] [address]  [phone]             NENA MENDOZA NP Follow up.    Specialty: Nurse Practitioner  Why: 1/19/2023 at 11:00 with JUSTUS Mendoza  Contact information:  [address]  [place] [address]  [phone]                       Patient Instructions:      Diet Adult Regular     No dressing needed     Activity as tolerated       Significant Diagnostic Studies: Labs:   BMP:   Recent Labs   Lab 01/10/23  0323   GLU 90      K 3.8      CO2 26   BUN 9   CREATININE 0.5   CALCIUM 9.1   , CBC   Recent Labs   Lab 01/10/23  0324   WBC 7.35   HGB 13.9   HCT 42.3        and All labs within the past 24 hours have been reviewed    Pending Diagnostic Studies:     None         Medications:  Reconciled Home Medications:      Medication List      START taking these medications    apixaban 5 mg Tab  Commonly known as: ELIQUIS  Take 2 tablets (10 mg total) by mouth 2 (two) times daily for 7 days, THEN 1 tablet (5 mg total) 2 (two) times daily.  Start taking on: January 11, 2023     cyanocobalamin 1000 MCG tablet  Commonly known as: VITAMIN B-12  Take 1 tablet (1,000 mcg total) by mouth once daily.  Start taking on: January 12, 2023     ferrous sulfate 325 (65 FE) MG EC tablet  Take 1 tablet (325 mg total) by mouth once daily.        CONTINUE taking these medications    brompheniramine-pseudoeph-DM 2-30-10 mg/5 mL Syrp  Commonly known as: BROMFED DM  Take 10 mLs by mouth 4 (four) times daily as needed.     ipratropium 21 mcg (0.03 %) nasal spray  Commonly known as: ATROVENT  1 spray by Each Nostril route 2 (two) times daily.        STOP taking these medications    amoxicillin-clavulanate 875-125mg 875-125 mg per tablet  Commonly known as: AUGMENTIN     etonogestreL-ethinyl estradioL 0.12-0.015 mg/24 hr vaginal ring  Commonly known as: NUVARING            Indwelling Lines/Drains at time of discharge:   Lines/Drains/Airways     Epidural Line  Duration                Epidural Catheter 10/22/21 446 days                Time spent on the discharge of patient: 55 minutes         Walt Rosario MD  Department of Hospital Medicine  [facility]

## 2023-01-11 NOTE — PROGRESS NOTES
Formerly [facility]  Department of Neurology  Neurology Progress Note        PATIENT NAME: Shereen Walker  MRN: [id number]  CSN: [id number]      TODAY'S DATE: 01/11/2023  ADMIT DATE: 1/8/2023                            CONSULTING PROVIDER: Andrey Angeles MD  CONSULT REQUESTED BY: Walt Rosario MD      Reason for consult: Headache, weakness and numbness       History obtained from chart review and the patient.    HPI per EMR: Shereen Walker is a 27 y.o. female presents with intermittent left arm heaviness and numbness patient reports this has been happening for a few minutes daily for the past 3 days patient describes a headache that occurs after these symptoms patient reports 2 episodes on Friday while episode on Saturday and 1 episode starting at approximately 10:30am a.m. today and resolving at approximately 11:00 a.m..  Patient reports this is associated with a feeling of heaviness in her left arm and loss of fine motor coordination patient reports that each time it involved her left arm patient reports that these episodes last about 20-30 minutes and then her arm returns to normal she also reports that there is a feeling of numbness to the left side of her face and the left side of her tongue patient reports that afterwards she notices a headache that develops.  Patient reports no current symptoms right now.  Patient has no significant past medical history patient has no other acute complaints at this time.       Neurology consult:  Patient was seen examined by me.  She states that over the last 2 weeks, she has been having a pressure like headache mostly on the right side radiating from the back of the head to the front.  She has associated photophobia however no nausea or vomiting.  She denies any positional or diurnal variation to the headache.  She denies any vision changes associated with the headache.  For the past 1 week, she has been having intermittent weakness/heaviness and numbness in her  right and left upper and lower extremities which is fluctuating.  She states that her headache has almost been persistent for the past 1 week and felt like a sinus pressure.    She denies any history of migraines or headaches in the past.  Denies any blood clots.  She endorses of 1 spontaneous miscarriage (1st trimester).  She denies any other significant past medical history.    1/10/2023: Patient was seen and examined by me this morning.  No acute overnight events, no new neurological complaints.  Vitals have been stable. Complains of a dull headache this morning which is improving with Tylenol.  She also complains of intermittent floaters in her vision.    1/11/2023:  No acute events overnight.  Headache is improved.  Lovenox was switched to Eliquis    PREVIOUS MEDICAL HISTORY:  Past Medical History:   Diagnosis Date    Herpes simplex virus (HSV) infection     HSV II     PREVIOUS SURGICAL HISTORY:  Past Surgical History:   Procedure Laterality Date    FINGER SURGERY Left 1995    TONSILLECTOMY, ADENOIDECTOMY, BILATERAL MYRINGOTOMY AND TUBES Bilateral 1996    WISDOM TOOTH EXTRACTION Bilateral 2010     FAMILY MEDICAL HISTORY:  Family History   Problem Relation Age of Onset    Hypertension Maternal Grandmother     Diabetes Maternal Grandmother     Kidney disease Maternal Grandmother     Arrhythmia Neg Hx     Cardiomyopathy Neg Hx     Congenital heart disease Neg Hx     Heart attacks under age 50 Neg Hx     Pacemaker/defibrilator Neg Hx      SOCIAL HISTORY:  Social History     Tobacco Use    Smoking status: Never    Smokeless tobacco: Never   Substance Use Topics    Alcohol use: No    Drug use: No     ALLERGIES:  Review of patient's allergies indicates:   Allergen Reactions    Suprax [cefixime] Hives     HOME MEDICATIONS:  Prior to Admission medications    Medication Sig Start Date End Date Taking? Authorizing Provider   amoxicillin-clavulanate 875-125mg (AUGMENTIN) 875-125 mg per tablet Take 1 tablet by mouth every  12 (twelve) hours. 12/31/22  Yes Historical Provider   etonogestreL-ethinyl estradioL (NUVARING) 0.12-0.015 mg/24 hr vaginal ring Place 1 each vaginally every 28 days. 12/20/22  Yes Historical Provider   ipratropium (ATROVENT) 21 mcg (0.03 %) nasal spray 1 spray by Each Nostril route 2 (two) times daily. 12/31/22  Yes Historical Provider   brompheniramine-pseudoeph-DM (BROMFED DM) 2-30-10 mg/5 mL Syrp Take 10 mLs by mouth 4 (four) times daily as needed. 12/31/22   Historical Provider     CURRENT SCHEDULED MEDICATIONS:   apixaban  10 mg Oral BID    aspirin  81 mg Oral Daily    atorvastatin  40 mg Oral Daily    cyanocobalamin  1,000 mcg Oral Daily    ferrous sulfate  1 tablet Oral Daily    ipratropium  1 spray Each Nostril BID     CURRENT INFUSIONS:   sodium chloride 0.9% 100 mL/hr at 01/10/23 1631     CURRENT PRN MEDICATIONS:  acetaminophen, ketorolac, sodium chloride 0.9%    REVIEW OF SYSTEMS:  Please refer to the HPI for all pertinent positive and negative findings. A comprehensive review of all other systems was negative.       PHYSICAL EXAM:  Patient Vitals for the past 24 hrs:   BP Temp Temp src Pulse Resp SpO2 Weight   01/11/23 0759 129/79 98.5 °F (36.9 °C) Oral 70 20 96 % --   01/11/23 0355 116/72 98.9 °F (37.2 °C) Oral 81 18 97 % 83.3 kg (183 lb 10.3 oz)   01/10/23 2359 108/66 98.1 °F (36.7 °C) Axillary 73 17 97 % --   01/10/23 2010 121/85 97.5 °F (36.4 °C) Axillary 72 17 98 % --   01/10/23 1523 110/65 98.7 °F (37.1 °C) Oral 101 16 98 % --   01/10/23 1216 122/77 98.2 °F (36.8 °C) Oral 73 20 97 % --       GENERAL APPEARANCE: Alert, well-developed, well-nourished female in no acute distress.  HEENT: Normocephalic and atraumatic. PERRL. Oropharynx unremarkable.  PULM: Normal respiratory effort. No accessory muscle use.  CV: RRR.  ABDOMEN: Soft, nontender.  EXTREMITIES: No obvious signs of vascular compromise. Pulses present. No cyanosis, clubbing or edema.  SKIN: Clear; no rashes, lesions or skin breaks in  exposed areas.    NEURO:  MENTAL STATUS:   , Patient awake and oriented to time, place, and person, recent/remote memory normal, attention span/concentration normal, speech fluent without paraphasic errors, good comprehension with appropriate thought content, and fund of knowledge appropriate for patient's level of education.  Affect euthymic.    CRANIAL NERVES:  CN I: Not tested.  CN II: Fundoscopic exam deferred.  CN III, IV, VI: Pupils equal, round and reactive to light.  Extraocular movements full and intact.  CN V: Facial sensation normal.  CN VII: Facial asymmetry absent.  CN VIII: Hearing grossly normal and equal bilaterally.  No skew deviation or pathologic nystagmus.  CN IX, X: Palate elevates symmetrically. Speech/articulation is clear without dysarthria.  CN XI: Shoulder shrug and chin rotation equal with good strength.  CN XII: Tongue protrusion midline.    MOTOR:  Bulk normal. Tone normal and symmetric throughout.  Abnormal movements absent.  Tremor: none present.  Strength 5/5 throughout.    REFLEXES:  DTRs 2+ throughout.  Plantar response downgoing bilaterally.  SENSATION: grossly intact throughout.  COORDINATION: normal finger-to-nose.  STATION: not tested.  GAIT: not tested.      NIHSS:  1a      Level of Consciousness (alert, drowsy, etc.):   0=alert; keenly responsive  1b.     Level of Consciousness Questions (month, age): 0= able to answer both questions  1c.     Level of Consciousness Commands (open, close eyes, make fist, let go):  0=Answers both tasks correctly  2.      Best Gaze (eyes open - patient follows examiner's finger or face):      0=normal  3.      Visual (introduce visual stimulus/threat to patient's visual field quadrants):  0=No visual loss  4.      Facial Palsy (show teeth, raise eyebrows and squeeze eyes shut):        0=Normal symmetric movement  5a.     Motor Arm - Left (elevate extremity 90 degrees and score drift/movement):       0=No drift, limb holds 90 (or 45) degrees for  full 10 seconds  5b.     Motor Arm - Right (elevate extremity 90 degrees and score drift/movement):      0=No drift, limb holds 90 (or 45) degrees for full 10 seconds  6a.     Motor Leg - Left (elevate extremity 30 degrees and score drift/movement):       0=No drift, limb holds 90 (or 45) degrees for full 10 seconds  6b      Motor Leg - Right (elevate extremity 30 degrees and score drift/movement):      0=No drift, limb holds 90 (or 45) degrees for full 10 seconds  7.      Limb Ataxia (finger-nose, heel down shin):      0=Absent  8.      Sensory (pin prick to face, arm, trunk, and leg - compare side to side):        0=Normal; no sensory loss  9.      Best Language (name items, describe a picture and read sentences):      0=No aphasia, normal  10.     Dysarthria (evaluate speech clarity by patient repeating listed words): 0=Normal  11.     Extinction and Inattention (use prior testing to identify neglect or double simultaneous stimuli testing):      0=No abnormality          NIH Stroke Scale Total:         0      Labs:  Recent Labs   Lab 01/08/23  1254 01/09/23  0432 01/10/23  0323    137 139   K 4.1 3.8 3.8    99 108   CO2 25 23 26   BUN 10 7 9   CREATININE 0.4* 0.5 0.5   GLU 93 97 90   CALCIUM 9.2 8.9 9.1   PHOS  --  3.5  --    MG  --  1.9  --      Recent Labs   Lab 01/08/23  1254 01/09/23  0433 01/10/23  0324   WBC 7.40 7.95 7.35   HGB 14.9 13.9 13.9   HCT 44.2 41.4 42.3    312 315     Recent Labs   Lab 01/08/23  1254 01/09/23  0432 01/10/23  0323   ALBUMIN 4.2 3.7 3.6   PROT 7.6 7.0 6.9   BILITOT 0.6 0.4 0.4   ALKPHOS 49* 48* 53*   ALT 9* 8* 8*   AST 14 12 12     Lab Results   Component Value Date    INR 1.0 01/09/2023     Lab Results   Component Value Date    TRIG 81 01/08/2023    HDL 69 01/08/2023    CHOLHDL 45.7 01/08/2023     Lab Results   Component Value Date    HGBA1C 5.3 01/09/2023     No results found for: PROTEINCSF, GLUCCSF, WBCCSF    Imaging:  I have reviewed and interpreted the  pertinent imaging and lab results.      CTA Head  The study was performed with thin sections with subsequent 3-D reformations  and reconstructions at multiple planes with images permanently stored in the PACS system.    All CT scans at this facility use dose modulation, degenerative reconstruction  and/or weight-based dosing when appropriate to reduce radiation dose to as low as reasonably achievable.    CT venogram of the brain    HISTORY: Dural venous sinus thrombosis.    The examination utilizes 100 mL of Omnipaque 350.    In keeping with the findings on preceding MRI, there is thrombosis of the majority of the superior sagittal sinus with sparing of the far anterior aspect of the sinus. Thrombus extends to the level of the torcula. There is partially occlusive thrombus within the right transverse sinus and a portion of the right sigmoid sinus. There is thrombosis within the right jugular bulb which extends into the visualized portion of the right internal jugular vein.    The remainder of the dural venous sinuses enhance appropriately.    IMPRESSION:    Dural venous sinus thrombosis as detailed with extension to involve the right jugular bulb and visualized segment of the right internal jugular vein.    Electronically signed by:  Alberta Albert MD  1/9/2023 3:47 PM CST Workstation: [id number]  MRI Brain W WO Contrast  MRI of the brain with and without contrast    HISTORY: Headaches, dizziness.    Multiplanar pre and postcontrast imaging are performed before and following intravenous administration of 10 mL Gadavist.    There is abnormal signal demonstrated throughout the majority of the superior sagittal sinus which extends into the right transverse sinus and involves a portion of the right sigmoid sinus and jugular bulb. Additionally, there is abnormal signal within several of the cortical veins in the parietal region bilaterally and right frontal region. There is absence of an appropriate flow void and apparent  filling defect on postcontrast imaging. The findings are compatible with dural venous sinus thrombosis with extension into some of the parietal cortical veins.    There are couple of tiny nonspecific foci of white matter hyperintensity observed subcortically in the parietal lobes. There are no findings of acute hemorrhage or infarction. There is no evidence of intra-axial mass or significant mass effect. There is no midline shift.    The ventricular system is appropriate in size and position for the patient's age. There are no extra-axial collections.    There is mild scattered mucosal thickening within the paranasal sinuses.    The skull base and craniocervical junction appear unremarkable.    IMPRESSION:    Findings compatible with dural venous sinus thrombosis as detailed above. There is also involvement of some of the superficial cortical veins. These findings were telephoned to Dr. Desai at 1:00 PM.    Small nonspecific subcortical foci of white matter hyperintensity within the parietal lobes bilaterally.    Electronically signed by:  Alberta Albert MD  1/9/2023 1:17 PM CST Workstation: [id number]  Echo Saline Bubble? Yes  · The left ventricle is normal in size with normal systolic function.  · Normal left ventricular diastolic function.  · The estimated PA systolic pressure is 30 mmHg.  · Normal right ventricular size with normal right ventricular systolic   function.  · Normal central venous pressure (3 mmHg).  · Mild tricuspid regurgitation.  · The estimated ejection fraction is 65%.  · Mild left atrial enlargement.  · There is no evidence of intracardiac shunting.            ASSESSMENT & PLAN:    Cerebral venous sinus thrombosis  Headache  Weakness and numbness    Plan  Patient presented with persistent pressure-like headache mostly on the right side and intermittent fluctuating weakness and numbness in her extremities.  MRI brain showed concern for superior sagittal sinus, right transverse sinus abnormality  concerning for cerebral sinus thrombosis. MRI brain was negative for stroke or ICH.   CT venogram head confirm above MRI findings of cerebral venous sinus thrombosis  Patient was started on Lovenox 1 milligram/kilogram b.i.d and was switched to Eliquis  Hematology has seen the patient.   Checking hypercoagulable panel  Recommend stopping the Nuvaring and consider progesterone only birth control to prevent hypercoagulability.  PT OT  IV fluids with normal saline  Tylenol PRN for headache.  Currently headache is better.   Ok for Discharge from neurology standpoint            Thank you kindly for including us in the care of this patient. Please do not hesitate to contact us with any questions.        32 minutes of care time has been spent evaluating with the patient. Time includes chart review not limited to diagnostic imaging, labs, and vitals, patient assessment, discussion with family and nursing, current order evaluations, and new order entries.       Andrey Angeles MD  Neurology/vascular Neurology  Date of Service: 01/11/2023  10:14 AM    --------------------------------------------------------------------------------------------------------------------------------------------------------------------------------------------------------------------------------------------------------------  Please note: This note was transcribed using voice recognition software. Because of this technology there are often uinintended grammatical, spelling, and other transcription errors. Please disregard these errors.

## 2023-01-11 NOTE — HOSPITAL COURSE
Patient is a 27-year-old female with no significant past medical history.  She presented with headaches and some neurological symptoms including tingling and vision changes.  She was found to have venous sinus thrombosis of her sagittal sinus, transverse sinus, right jugular bulb and right internal jugular vein.  The patient uses estrogen containing birth control NuvaRing.  She has never had venous thrombosis in the past.  She has no significant family history of clotting disorder.  Neurology and hematology both evaluated the patient and she was started on anticoagulation.  Her headaches improved and her neurological symptoms improved over the course of her hospitalization.  She was transition to Eliquis which she will continue for anticoagulation.  She will follow-up with Dr. Bryson as an outpatient for hypercoagulability workup.  Of note, she is iron deficient and will start oral iron at discharge.  Additionally, she was found to be B12 deficient and will start supplementation with this as well.  CT scan of the chest abdomen pelvis was obtained to rule out underlying malignancy.  There was no obvious mass or malignancy but she does have a 10 mm left breast nodule as well as a few small pulmonary nodules.  Given that she will need to discontinue her current birth control and discuss alternative means of control, I have referred her back to her OB gyn for management of this. She will follow up with Dr Bryson and can undergo mammogram for left breast nodule, and follow up CT if needed (though she would typically be considered low risk) for pulmonary nodules. I reviewed the discharge plan of care instructions with the patient on the day of discharge.  She was discharged in good condition with plans for close outpatient follow-up with her providers.

## 2023-01-27 DIAGNOSIS — N64.4 MASTODYNIA: Primary | ICD-10-CM

## 2023-02-06 ENCOUNTER — OFFICE VISIT (OUTPATIENT)
Dept: HEMATOLOGY/ONCOLOGY | Facility: CLINIC | Age: 28
End: 2023-02-06
Payer: MEDICAID

## 2023-02-06 VITALS
DIASTOLIC BLOOD PRESSURE: 89 MMHG | SYSTOLIC BLOOD PRESSURE: 127 MMHG | HEART RATE: 71 BPM | WEIGHT: 181.69 LBS | BODY MASS INDEX: 32.19 KG/M2 | TEMPERATURE: 99 F

## 2023-02-06 DIAGNOSIS — G08 DURAL VENOUS SINUS THROMBOSIS: ICD-10-CM

## 2023-02-06 DIAGNOSIS — E53.8 B12 DEFICIENCY: ICD-10-CM

## 2023-02-06 PROCEDURE — 3044F PR MOST RECENT HEMOGLOBIN A1C LEVEL <7.0%: ICD-10-PCS | Mod: CPTII,S$GLB,, | Performed by: INTERNAL MEDICINE

## 2023-02-06 PROCEDURE — 1111F PR DISCHARGE MEDS RECONCILED W/ CURRENT OUTPATIENT MED LIST: ICD-10-PCS | Mod: CPTII,S$GLB,, | Performed by: INTERNAL MEDICINE

## 2023-02-06 PROCEDURE — 3079F DIAST BP 80-89 MM HG: CPT | Mod: CPTII,S$GLB,, | Performed by: INTERNAL MEDICINE

## 2023-02-06 PROCEDURE — 3008F BODY MASS INDEX DOCD: CPT | Mod: CPTII,S$GLB,, | Performed by: INTERNAL MEDICINE

## 2023-02-06 PROCEDURE — 1159F PR MEDICATION LIST DOCUMENTED IN MEDICAL RECORD: ICD-10-PCS | Mod: CPTII,S$GLB,, | Performed by: INTERNAL MEDICINE

## 2023-02-06 PROCEDURE — 1111F DSCHRG MED/CURRENT MED MERGE: CPT | Mod: CPTII,S$GLB,, | Performed by: INTERNAL MEDICINE

## 2023-02-06 PROCEDURE — 3044F HG A1C LEVEL LT 7.0%: CPT | Mod: CPTII,S$GLB,, | Performed by: INTERNAL MEDICINE

## 2023-02-06 PROCEDURE — 3074F SYST BP LT 130 MM HG: CPT | Mod: CPTII,S$GLB,, | Performed by: INTERNAL MEDICINE

## 2023-02-06 PROCEDURE — 3074F PR MOST RECENT SYSTOLIC BLOOD PRESSURE < 130 MM HG: ICD-10-PCS | Mod: CPTII,S$GLB,, | Performed by: INTERNAL MEDICINE

## 2023-02-06 PROCEDURE — 99214 OFFICE O/P EST MOD 30 MIN: CPT | Mod: S$GLB,,, | Performed by: INTERNAL MEDICINE

## 2023-02-06 PROCEDURE — 3079F PR MOST RECENT DIASTOLIC BLOOD PRESSURE 80-89 MM HG: ICD-10-PCS | Mod: CPTII,S$GLB,, | Performed by: INTERNAL MEDICINE

## 2023-02-06 PROCEDURE — 1160F PR REVIEW ALL MEDS BY PRESCRIBER/CLIN PHARMACIST DOCUMENTED: ICD-10-PCS | Mod: CPTII,S$GLB,, | Performed by: INTERNAL MEDICINE

## 2023-02-06 PROCEDURE — 1159F MED LIST DOCD IN RCRD: CPT | Mod: CPTII,S$GLB,, | Performed by: INTERNAL MEDICINE

## 2023-02-06 PROCEDURE — 99214 PR OFFICE/OUTPT VISIT, EST, LEVL IV, 30-39 MIN: ICD-10-PCS | Mod: S$GLB,,, | Performed by: INTERNAL MEDICINE

## 2023-02-06 PROCEDURE — 3008F PR BODY MASS INDEX (BMI) DOCUMENTED: ICD-10-PCS | Mod: CPTII,S$GLB,, | Performed by: INTERNAL MEDICINE

## 2023-02-06 PROCEDURE — 1160F RVW MEDS BY RX/DR IN RCRD: CPT | Mod: CPTII,S$GLB,, | Performed by: INTERNAL MEDICINE

## 2023-02-06 NOTE — ASSESSMENT & PLAN NOTE
This is a new diagnosis and I discussed the clear need for hypercoag work up given the rarity of clots in this area.  She is on Eliquis therapy and will continue this for the foreseeable future and this was discussed today.  Will have her back with me in about 6 weeks to follow up the hypercoag work up.  Spent over 30 min in total care today including pre-visit review and charting, visit and post-visit planning.

## 2023-02-06 NOTE — PROGRESS NOTES
PROGRESS NOTE    Subjective:       Patient ID: Shereen Walker is a 27 y.o. female.    1/9/2023:  MRI Brain:  Findings compatible with dural venous sinus thrombosis as detailed above. There is also involvement of some of the superficial cortical veins  She was on NuvaRing birth control which was removed.    Had Mirena ring placed 2/3/2023-told by OB this was less estrogen  Started on Eliquis    1/11/2023:  CT chest/a/p:  10mm nodular density in central left breast  Small noncalcified lung nodules    Chief Complaint:  No chief complaint on file.  Cerbral venous thrombosis follow up    History of Present Illness:   Shereen Walker is a 27 y.o. female who presents for follow up of above.     Patient here on follow up from hospital for above.  Doing well now and has no further HAs.      Continues on Eliquis 5mg bid as of today, 2/6/2023 and tolerating this well.         1/10/2023:  Hospital Consult HPI:  Ms. Walker is a 28yo female who is admitted with 2 weeks of congestion and progressively worsening head aches that evolved to 3 episodes of right arm numbness was admitted today after CNS imaging showed cerebral venous thrombosis.  Patient has no past medical history of clotting problems nor does she have a FH of such.  She has been on birth control for the last year but no new medications o/w.  She has been placed on Lovenox and is doing ok at this time with no further neurologic issues.      Family and Social history reviewed and is unchanged from 1/10/2023      ROS:  Review of Systems   Constitutional:  Negative for fever.   Respiratory:  Negative for shortness of breath.    Cardiovascular:  Negative for chest pain and leg swelling.   Gastrointestinal:  Negative for abdominal pain and blood in stool.   Genitourinary:  Negative for hematuria.   Skin:  Negative for rash.        Current Outpatient Medications:     apixaban (ELIQUIS) 5 mg Tab, Take 2 tablets (10 mg  total) by mouth 2 (two) times daily for 7 days, THEN 1 tablet (5 mg total) 2 (two) times daily., Disp: 208 tablet, Rfl: 0    apixaban (ELIQUIS) 5 mg Tab, Take 2 tablets (10 mg total) by mouth 2 (two) times daily for 7 days, THEN 1 tablet (5 mg total) 2 (two) times daily., Disp: 208 tablet, Rfl: 0    brompheniramine-pseudoeph-DM (BROMFED DM) 2-30-10 mg/5 mL Syrp, Take 10 mLs by mouth 4 (four) times daily as needed., Disp: , Rfl:     cyanocobalamin (VITAMIN B-12) 1000 MCG tablet, Take 1 tablet (1,000 mcg total) by mouth once daily., Disp: 30 tablet, Rfl: 0    ferrous sulfate 325 (65 FE) MG EC tablet, Take 1 tablet (325 mg total) by mouth once daily., Disp: 30 tablet, Rfl: 0    ipratropium (ATROVENT) 21 mcg (0.03 %) nasal spray, 1 spray by Each Nostril route 2 (two) times daily., Disp: , Rfl:         Objective:       Physical Examination:     /89   Pulse 71   Temp 98.6 °F (37 °C)   Wt 82.4 kg (181 lb 11.2 oz)   BMI 32.19 kg/m²     Physical Exam  Constitutional:       Appearance: She is well-developed.   HENT:      Head: Normocephalic and atraumatic.      Right Ear: External ear normal.      Left Ear: External ear normal.   Eyes:      Conjunctiva/sclera: Conjunctivae normal.      Pupils: Pupils are equal, round, and reactive to light.   Neck:      Thyroid: No thyromegaly.      Trachea: No tracheal deviation.   Cardiovascular:      Rate and Rhythm: Normal rate and regular rhythm.      Heart sounds: Normal heart sounds.   Pulmonary:      Effort: Pulmonary effort is normal.      Breath sounds: Normal breath sounds.   Abdominal:      General: Bowel sounds are normal. There is no distension.      Palpations: Abdomen is soft. There is no mass.      Tenderness: There is no abdominal tenderness.   Skin:     Findings: No rash.   Neurological:      Comments: Neuro intact througout   Psychiatric:         Behavior: Behavior normal.         Thought Content: Thought content normal.         Judgment: Judgment normal.        Labs:   No results found for this or any previous visit (from the past 336 hour(s)).  CMP  Sodium   Date Value Ref Range Status   01/10/2023 139 136 - 145 mmol/L Final     Potassium   Date Value Ref Range Status   01/10/2023 3.8 3.5 - 5.1 mmol/L Final     Chloride   Date Value Ref Range Status   01/10/2023 108 95 - 110 mmol/L Final     CO2   Date Value Ref Range Status   01/10/2023 26 23 - 29 mmol/L Final     Glucose   Date Value Ref Range Status   01/10/2023 90 70 - 110 mg/dL Final     BUN   Date Value Ref Range Status   01/10/2023 9 6 - 20 mg/dL Final     Creatinine   Date Value Ref Range Status   01/10/2023 0.5 0.5 - 1.4 mg/dL Final     Calcium   Date Value Ref Range Status   01/10/2023 9.1 8.7 - 10.5 mg/dL Final     Total Protein   Date Value Ref Range Status   01/10/2023 6.9 6.0 - 8.4 g/dL Final     Albumin   Date Value Ref Range Status   01/10/2023 3.6 3.5 - 5.2 g/dL Final     Total Bilirubin   Date Value Ref Range Status   01/10/2023 0.4 0.1 - 1.0 mg/dL Final     Comment:     For infants and newborns, interpretation of results should be based  on gestational age, weight and in agreement with clinical  observations.    Premature Infant recommended reference ranges:  Up to 24 hours.............<8.0 mg/dL  Up to 48 hours............<12.0 mg/dL  3-5 days..................<15.0 mg/dL  6-29 days.................<15.0 mg/dL       Alkaline Phosphatase   Date Value Ref Range Status   01/10/2023 53 (L) 55 - 135 U/L Final     AST   Date Value Ref Range Status   01/10/2023 12 10 - 40 U/L Final     ALT   Date Value Ref Range Status   01/10/2023 8 (L) 10 - 44 U/L Final     Anion Gap   Date Value Ref Range Status   01/10/2023 5 (L) 8 - 16 mmol/L Final     eGFR if    Date Value Ref Range Status   09/10/2021 >60.0 >60 mL/min/1.73 m^2 Final     eGFR if non    Date Value Ref Range Status   09/10/2021 >60.0 >60 mL/min/1.73 m^2 Final     Comment:     Calculation used to obtain the estimated  glomerular filtration  rate (eGFR) is the CKD-EPI equation.        No results found for: CEA  No results found for: PSA        Assessment/Plan:     Problem List Items Addressed This Visit       Dural venous sinus thrombosis     This is a new diagnosis and I discussed the clear need for hypercoag work up given the rarity of clots in this area.  She is on Eliquis therapy and will continue this for the foreseeable future and this was discussed today.  Will have her back with me in about 6 weeks to follow up the hypercoag work up.  Spent over 30 min in total care today including pre-visit review and charting, visit and post-visit planning.            Relevant Orders    Anticardiolipin Ab (LINK), IgA/IgG/IgM, QN    Antithrombin III    Beta-2 Glycoprotein Abs (IgA, IgG, IgM)    Factor V (Leiden) Mutation Analysis    Lupus Anticoagulant Eval w/Reflex    Phosphatidylserine Ab (IgA,IgG,IgM)    Protein C Activity and Antigen    Protein S Activity and Antigen, Total    Prothrombin [id number] Mutation    CBC Auto Differential    Comprehensive Metabolic Panel    B12 deficiency     She is on oral B12 at this time.  Will check her level and monitor further.          Relevant Orders    Vitamin B12       Discussion:     Follow up in about 6 weeks (around 3/20/2023).      Electronically signed by Kwasi Bryson

## 2023-02-14 LAB
ALBUMIN SERPL-MCNC: 4 G/DL (ref 3.6–5.1)
ALBUMIN/GLOB SERPL: 1.6 (CALC) (ref 1–2.5)
ALP SERPL-CCNC: 68 U/L (ref 31–125)
ALT SERPL-CCNC: 9 U/L (ref 6–29)
AST SERPL-CCNC: 14 U/L (ref 10–30)
AT III AG PPP IA-MCNC: 88 % NORMAL (ref 80–120)
B2 GLYCOPROT1 IGA SER-ACNC: <2 U/ML
BASOPHILS # BLD AUTO: 42 CELLS/UL (ref 0–200)
BASOPHILS NFR BLD AUTO: 0.6 %
BILIRUB SERPL-MCNC: 0.8 MG/DL (ref 0.2–1.2)
BUN SERPL-MCNC: 7 MG/DL (ref 7–25)
BUN/CREAT SERPL: ABNORMAL (CALC) (ref 6–22)
CALCIUM SERPL-MCNC: 9.2 MG/DL (ref 8.6–10.2)
CHLORIDE SERPL-SCNC: 103 MMOL/L (ref 98–110)
CO2 SERPL-SCNC: 22 MMOL/L (ref 20–32)
CREAT SERPL-MCNC: 0.5 MG/DL (ref 0.5–0.96)
EGFR: 130 ML/MIN/1.73M2
EOSINOPHIL # BLD AUTO: 364 CELLS/UL (ref 15–500)
EOSINOPHIL NFR BLD AUTO: 5.2 %
ERYTHROCYTE [DISTWIDTH] IN BLOOD BY AUTOMATED COUNT: 12.1 % (ref 11–15)
F2 C.20210G>A GENO BLD/T: NEGATIVE
F2 GENE MUT ANL BLD/T: NORMAL
F5 GENE MUT ANL BLD/T: NORMAL
F5 P.R506Q BLD/T QL: NEGATIVE
GLOBULIN SER CALC-MCNC: 2.5 G/DL (CALC) (ref 1.9–3.7)
GLUCOSE SERPL-MCNC: 111 MG/DL (ref 65–99)
HCT VFR BLD AUTO: 39.7 % (ref 35–45)
HGB BLD-MCNC: 13.5 G/DL (ref 11.7–15.5)
LA 2 SCREEN W REFLEX-IMP: NORMAL
LYMPHOCYTES # BLD AUTO: 2198 CELLS/UL (ref 850–3900)
LYMPHOCYTES NFR BLD AUTO: 31.4 %
MCH RBC QN AUTO: 30.1 PG (ref 27–33)
MCHC RBC AUTO-ENTMCNC: 34 G/DL (ref 32–36)
MCV RBC AUTO: 88.6 FL (ref 80–100)
MONOCYTES # BLD AUTO: 259 CELLS/UL (ref 200–950)
MONOCYTES NFR BLD AUTO: 3.7 %
NEUTROPHILS # BLD AUTO: 4137 CELLS/UL (ref 1500–7800)
NEUTROPHILS NFR BLD AUTO: 59.1 %
PLATELET # BLD AUTO: 274 THOUSAND/UL (ref 140–400)
PMV BLD REES-ECKER: 10.3 FL (ref 7.5–12.5)
POTASSIUM SERPL-SCNC: 3.6 MMOL/L (ref 3.5–5.3)
PROT C ACT/NOR PPP: 168 % NORMAL (ref 70–180)
PROT C AG ACT/NOR PPP IA: 121 % NORMAL (ref 70–140)
PROT S ACT/NOR PPP: 77 % NORMAL (ref 60–140)
PROT SERPL-MCNC: 6.5 G/DL (ref 6.1–8.1)
PS-PROTHROM CMPLX IGG SERPL IA-ACNC: <9 U
PS-PROTHROM CMPLX IGM SERPL IA-ACNC: 19 U
RBC # BLD AUTO: 4.48 MILLION/UL (ref 3.8–5.1)
SCREEN APTT: 36 SECONDS
SCREEN DRVVT: 40 SECONDS
SODIUM SERPL-SCNC: 137 MMOL/L (ref 135–146)
VIT B12 SERPL-MCNC: 541 PG/ML (ref 200–1100)
WBC # BLD AUTO: 7 THOUSAND/UL (ref 3.8–10.8)

## 2023-02-27 ENCOUNTER — HOSPITAL ENCOUNTER (OUTPATIENT)
Dept: RADIOLOGY | Facility: HOSPITAL | Age: 28
Discharge: HOME | End: 2023-02-27
Attending: OBSTETRICS & GYNECOLOGY
Payer: MEDICAID

## 2023-02-27 DIAGNOSIS — N64.4 MASTODYNIA: ICD-10-CM

## 2023-02-27 PROCEDURE — 76642 ULTRASOUND BREAST LIMITED: CPT | Mod: TC,PO,LT

## 2023-03-17 ENCOUNTER — HOSPITAL ENCOUNTER (OUTPATIENT)
Dept: RADIOLOGY | Facility: HOSPITAL | Age: 28
Discharge: HOME | End: 2023-03-17
Attending: OBSTETRICS & GYNECOLOGY
Payer: MEDICAID

## 2023-03-17 DIAGNOSIS — R92.8 ABNORMAL MAMMOGRAM: ICD-10-CM

## 2023-03-17 PROCEDURE — 25000003 PHARM REV CODE 250: Mod: PO | Performed by: RADIOLOGY

## 2023-03-17 PROCEDURE — 27000342 US BREAST BIOPSY WITH IMAGING 1ST SITE LEFT: Mod: PO

## 2023-03-17 RX ORDER — LIDOCAINE HYDROCHLORIDE 10 MG/ML
INJECTION, SOLUTION EPIDURAL; INFILTRATION; INTRACAUDAL; PERINEURAL
Status: COMPLETED | OUTPATIENT
Start: 2023-03-17 | End: 2023-03-17

## 2023-03-17 RX ADMIN — LIDOCAINE HYDROCHLORIDE 6 ML: 10 INJECTION, SOLUTION EPIDURAL; INFILTRATION; INTRACAUDAL; PERINEURAL at 01:03

## 2023-03-17 NOTE — PLAN OF CARE
Patient tolerated procedure well.  Gauze/tegaderm dressing to site-left breast.  Ice pack provided.  Verbal and written discharge instructions given.  Patient verbalized understanding.  Discharged to home via private vehicle.

## 2023-03-17 NOTE — PLAN OF CARE
Ambulated to ultrasound .  Id'd x 2 pt identifiers.  AAO x 3. BLAKE x4. Resp REU.   Denies pain or nausea. All questions answered.

## 2023-03-20 ENCOUNTER — OFFICE VISIT (OUTPATIENT)
Dept: HEMATOLOGY/ONCOLOGY | Facility: CLINIC | Age: 28
End: 2023-03-20
Payer: MEDICAID

## 2023-03-20 VITALS
OXYGEN SATURATION: 98 % | DIASTOLIC BLOOD PRESSURE: 77 MMHG | RESPIRATION RATE: 18 BRPM | HEIGHT: 63 IN | BODY MASS INDEX: 32.78 KG/M2 | HEART RATE: 70 BPM | WEIGHT: 185 LBS | TEMPERATURE: 97 F | SYSTOLIC BLOOD PRESSURE: 136 MMHG

## 2023-03-20 DIAGNOSIS — R91.1 LUNG NODULE: ICD-10-CM

## 2023-03-20 DIAGNOSIS — G08 DURAL VENOUS SINUS THROMBOSIS: ICD-10-CM

## 2023-03-20 DIAGNOSIS — N63.42 SUBAREOLAR MASS OF LEFT BREAST: ICD-10-CM

## 2023-03-20 PROCEDURE — 3044F PR MOST RECENT HEMOGLOBIN A1C LEVEL <7.0%: ICD-10-PCS | Mod: CPTII,S$GLB,, | Performed by: INTERNAL MEDICINE

## 2023-03-20 PROCEDURE — 3078F DIAST BP <80 MM HG: CPT | Mod: CPTII,S$GLB,, | Performed by: INTERNAL MEDICINE

## 2023-03-20 PROCEDURE — 99214 PR OFFICE/OUTPT VISIT, EST, LEVL IV, 30-39 MIN: ICD-10-PCS | Mod: S$GLB,,, | Performed by: INTERNAL MEDICINE

## 2023-03-20 PROCEDURE — 3078F PR MOST RECENT DIASTOLIC BLOOD PRESSURE < 80 MM HG: ICD-10-PCS | Mod: CPTII,S$GLB,, | Performed by: INTERNAL MEDICINE

## 2023-03-20 PROCEDURE — 99214 OFFICE O/P EST MOD 30 MIN: CPT | Mod: S$GLB,,, | Performed by: INTERNAL MEDICINE

## 2023-03-20 PROCEDURE — 3075F SYST BP GE 130 - 139MM HG: CPT | Mod: CPTII,S$GLB,, | Performed by: INTERNAL MEDICINE

## 2023-03-20 PROCEDURE — 1159F MED LIST DOCD IN RCRD: CPT | Mod: CPTII,S$GLB,, | Performed by: INTERNAL MEDICINE

## 2023-03-20 PROCEDURE — 3044F HG A1C LEVEL LT 7.0%: CPT | Mod: CPTII,S$GLB,, | Performed by: INTERNAL MEDICINE

## 2023-03-20 PROCEDURE — 1159F PR MEDICATION LIST DOCUMENTED IN MEDICAL RECORD: ICD-10-PCS | Mod: CPTII,S$GLB,, | Performed by: INTERNAL MEDICINE

## 2023-03-20 PROCEDURE — 3075F PR MOST RECENT SYSTOLIC BLOOD PRESS GE 130-139MM HG: ICD-10-PCS | Mod: CPTII,S$GLB,, | Performed by: INTERNAL MEDICINE

## 2023-03-20 PROCEDURE — 3008F BODY MASS INDEX DOCD: CPT | Mod: CPTII,S$GLB,, | Performed by: INTERNAL MEDICINE

## 2023-03-20 PROCEDURE — 3008F PR BODY MASS INDEX (BMI) DOCUMENTED: ICD-10-PCS | Mod: CPTII,S$GLB,, | Performed by: INTERNAL MEDICINE

## 2023-03-20 RX ORDER — LEVONORGESTREL 52 MG/1
INTRAUTERINE DEVICE INTRAUTERINE
COMMUNITY
Start: 2023-01-30

## 2023-03-20 RX ORDER — FERROUS SULFATE 325(65) MG
TABLET ORAL
COMMUNITY
Start: 2023-01-11

## 2023-03-20 NOTE — PROGRESS NOTES
PROGRESS NOTE    Subjective:       Patient ID: Shereen Walker is a 27 y.o. female.    1/9/2023:  MRI Brain:  Findings compatible with dural venous sinus thrombosis as detailed above. There is also involvement of some of the superficial cortical veins  She was on NuvaRing birth control which was removed.    Had Mirena ring placed 2/3/2023-told by OB this was less estrogen  Started on Eliquis    1/11/2023:  CT chest/a/p:  10mm nodular density in central left breast  Small noncalcified lung nodules    1/11/2023:  CT chest:  10 mm nodular density within the central aspect of the left breast. Correlation with targeted ultrasound is recommended.     Small noncalcified pulmonary nodules.    2/27/2023:  1.3 x 0.5 x 1.4 cm lobulated hypoechoic mass is felt to correspond with CT abnormality.  Biopsy of this mass is recommended.  I discussed the findings and recommendation for biopsy with patient at time of diagnostic mammogram.    3/17/2023:  US guided needle biopsy of left breast mass Pending    Chief Complaint:  No chief complaint on file.  Cerbral venous thrombosis follow up    History of Present Illness:   Shereen Walker is a 27 y.o. female who presents for follow up of above.     Ms. Walker is doing well.  No new complaints at this time.     Continues on Eliquis 5mg bid as of today, 3/20/2023 and tolerating this well.         1/10/2023:  Hospital Consult HPI:  Ms. Walker is a 26yo female who is admitted with 2 weeks of congestion and progressively worsening head aches that evolved to 3 episodes of right arm numbness was admitted today after CNS imaging showed cerebral venous thrombosis.  Patient has no past medical history of clotting problems nor does she have a FH of such.  She has been on birth control for the last year but no new medications o/w.  She has been placed on Lovenox and is doing ok at this time with no further neurologic issues.      Family and  "Social history reviewed and is unchanged from 1/10/2023      ROS:  Review of Systems   Constitutional:  Negative for fever.   Respiratory:  Negative for shortness of breath.    Cardiovascular:  Negative for chest pain and leg swelling.   Gastrointestinal:  Negative for abdominal pain and blood in stool.   Genitourinary:  Negative for hematuria.   Skin:  Negative for rash.        Current Outpatient Medications:     apixaban (ELIQUIS) 5 mg Tab, Take 2 tablets (10 mg total) by mouth 2 (two) times daily for 7 days, THEN 1 tablet (5 mg total) 2 (two) times daily., Disp: 208 tablet, Rfl: 0    apixaban (ELIQUIS) 5 mg Tab, TAKE 1 TABLET BY MOUTH TWICE DAILY, Disp: 208 tablet, Rfl: 0    brompheniramine-pseudoeph-DM (BROMFED DM) 2-30-10 mg/5 mL Syrp, Take 10 mLs by mouth 4 (four) times daily as needed., Disp: , Rfl:     ferrous sulfate (FEOSOL) 325 mg (65 mg iron) Tab tablet, Take by mouth., Disp: , Rfl:     ipratropium (ATROVENT) 21 mcg (0.03 %) nasal spray, 1 spray by Each Nostril route 2 (two) times daily., Disp: , Rfl:     MIRENA 21 mcg/24 hours (8 yrs) 52 mg IUD, SMARTSIG:Vaginal, Disp: , Rfl:         Objective:       Physical Examination:     /77   Pulse 70   Temp 97.3 °F (36.3 °C)   Resp 18   Ht 5' 3" (1.6 m)   Wt 83.9 kg (185 lb)   SpO2 98%   BMI 32.77 kg/m²     Physical Exam  Constitutional:       Appearance: She is well-developed.   HENT:      Head: Normocephalic and atraumatic.      Right Ear: External ear normal.      Left Ear: External ear normal.   Eyes:      Conjunctiva/sclera: Conjunctivae normal.      Pupils: Pupils are equal, round, and reactive to light.   Neck:      Thyroid: No thyromegaly.      Trachea: No tracheal deviation.   Cardiovascular:      Rate and Rhythm: Normal rate and regular rhythm.      Heart sounds: Normal heart sounds.   Pulmonary:      Effort: Pulmonary effort is normal.      Breath sounds: Normal breath sounds.   Abdominal:      General: Bowel sounds are normal. There is no " distension.      Palpations: Abdomen is soft. There is no mass.      Tenderness: There is no abdominal tenderness.   Skin:     Findings: No rash.   Neurological:      Comments: Neuro intact througout   Psychiatric:         Behavior: Behavior normal.         Thought Content: Thought content normal.         Judgment: Judgment normal.       Labs:   No results found for this or any previous visit (from the past 336 hour(s)).  CMP  Sodium   Date Value Ref Range Status   02/06/2023 137 135 - 146 mmol/L Final     Potassium   Date Value Ref Range Status   02/06/2023 3.6 3.5 - 5.3 mmol/L Final     Chloride   Date Value Ref Range Status   02/06/2023 103 98 - 110 mmol/L Final     CO2   Date Value Ref Range Status   02/06/2023 22 20 - 32 mmol/L Final     Glucose   Date Value Ref Range Status   02/06/2023 111 (H) 65 - 99 mg/dL Final     Comment:                   Fasting reference interval     For someone without known diabetes, a glucose value  between 100 and 125 mg/dL is consistent with  prediabetes and should be confirmed with a  follow-up test.          BUN   Date Value Ref Range Status   02/06/2023 7 7 - 25 mg/dL Final     Creatinine   Date Value Ref Range Status   02/06/2023 0.50 0.50 - 0.96 mg/dL Final     Calcium   Date Value Ref Range Status   02/06/2023 9.2 8.6 - 10.2 mg/dL Final     Total Protein   Date Value Ref Range Status   02/06/2023 6.5 6.1 - 8.1 g/dL Final     Albumin   Date Value Ref Range Status   02/06/2023 4.0 3.6 - 5.1 g/dL Final     Total Bilirubin   Date Value Ref Range Status   02/06/2023 0.8 0.2 - 1.2 mg/dL Final     Alkaline Phosphatase   Date Value Ref Range Status   01/10/2023 53 (L) 55 - 135 U/L Final     AST   Date Value Ref Range Status   02/06/2023 14 10 - 30 U/L Final     ALT   Date Value Ref Range Status   02/06/2023 9 6 - 29 U/L Final     Anion Gap   Date Value Ref Range Status   01/10/2023 5 (L) 8 - 16 mmol/L Final     eGFR if    Date Value Ref Range Status   09/10/2021  >60.0 >60 mL/min/1.73 m^2 Final     eGFR if non    Date Value Ref Range Status   09/10/2021 >60.0 >60 mL/min/1.73 m^2 Final     Comment:     Calculation used to obtain the estimated glomerular filtration  rate (eGFR) is the CKD-EPI equation.        No results found for: CEA  No results found for: PSA        Assessment/Plan:     Problem List Items Addressed This Visit       Subareolar mass of left breast     Biopsy has been done and is pending at this time.  Will continue to follow this closely.           Lung nodule     Will repeat CT scan at 3 months and will arrange this now.  Discussed today.  Likely benign.          Relevant Orders    CT Chest Without Contrast    Dural venous sinus thrombosis     Hypercoag panel is negative and I discussed this today.  Will continue Eliquis for at least 6 months then may switch to low dose but she will need some protection long term and Id discussed this also.           Discussion:     Follow up in about 6 weeks (around 5/1/2023).      Electronically signed by Kwasi Bryson

## 2023-03-20 NOTE — ASSESSMENT & PLAN NOTE
Hypercoag panel is negative and I discussed this today.  Will continue Eliquis for at least 6 months then may switch to low dose but she will need some protection long term and Id discussed this also.

## 2023-03-22 ENCOUNTER — TELEPHONE (OUTPATIENT)
Dept: HEMATOLOGY/ONCOLOGY | Facility: CLINIC | Age: 28
End: 2023-03-22

## 2023-03-29 ENCOUNTER — HOSPITAL ENCOUNTER (OUTPATIENT)
Dept: RADIOLOGY | Facility: HOSPITAL | Age: 28
Discharge: HOME | End: 2023-03-29
Attending: INTERNAL MEDICINE
Payer: MEDICAID

## 2023-03-29 DIAGNOSIS — R91.1 LUNG NODULE: ICD-10-CM

## 2023-03-29 PROCEDURE — 71250 CT THORAX DX C-: CPT | Mod: TC,PO

## 2023-03-30 ENCOUNTER — PATIENT MESSAGE (OUTPATIENT)
Dept: HEMATOLOGY/ONCOLOGY | Facility: CLINIC | Age: 28
End: 2023-03-30

## 2023-05-01 ENCOUNTER — OFFICE VISIT (OUTPATIENT)
Dept: HEMATOLOGY/ONCOLOGY | Facility: CLINIC | Age: 28
End: 2023-05-01
Payer: MEDICAID

## 2023-05-01 VITALS
DIASTOLIC BLOOD PRESSURE: 83 MMHG | WEIGHT: 185.88 LBS | HEART RATE: 80 BPM | SYSTOLIC BLOOD PRESSURE: 131 MMHG | TEMPERATURE: 98 F | BODY MASS INDEX: 32.93 KG/M2

## 2023-05-01 DIAGNOSIS — G08 DURAL VENOUS SINUS THROMBOSIS: ICD-10-CM

## 2023-05-01 DIAGNOSIS — R91.1 LUNG NODULE: ICD-10-CM

## 2023-05-01 PROCEDURE — 3044F PR MOST RECENT HEMOGLOBIN A1C LEVEL <7.0%: ICD-10-PCS | Mod: CPTII,S$GLB,, | Performed by: INTERNAL MEDICINE

## 2023-05-01 PROCEDURE — 99213 PR OFFICE/OUTPT VISIT, EST, LEVL III, 20-29 MIN: ICD-10-PCS | Mod: S$GLB,,, | Performed by: INTERNAL MEDICINE

## 2023-05-01 PROCEDURE — 3079F PR MOST RECENT DIASTOLIC BLOOD PRESSURE 80-89 MM HG: ICD-10-PCS | Mod: CPTII,S$GLB,, | Performed by: INTERNAL MEDICINE

## 2023-05-01 PROCEDURE — 1159F MED LIST DOCD IN RCRD: CPT | Mod: CPTII,S$GLB,, | Performed by: INTERNAL MEDICINE

## 2023-05-01 PROCEDURE — 99213 OFFICE O/P EST LOW 20 MIN: CPT | Mod: S$GLB,,, | Performed by: INTERNAL MEDICINE

## 2023-05-01 PROCEDURE — 3075F SYST BP GE 130 - 139MM HG: CPT | Mod: CPTII,S$GLB,, | Performed by: INTERNAL MEDICINE

## 2023-05-01 PROCEDURE — 1160F PR REVIEW ALL MEDS BY PRESCRIBER/CLIN PHARMACIST DOCUMENTED: ICD-10-PCS | Mod: CPTII,S$GLB,, | Performed by: INTERNAL MEDICINE

## 2023-05-01 PROCEDURE — 3075F PR MOST RECENT SYSTOLIC BLOOD PRESS GE 130-139MM HG: ICD-10-PCS | Mod: CPTII,S$GLB,, | Performed by: INTERNAL MEDICINE

## 2023-05-01 PROCEDURE — 1160F RVW MEDS BY RX/DR IN RCRD: CPT | Mod: CPTII,S$GLB,, | Performed by: INTERNAL MEDICINE

## 2023-05-01 PROCEDURE — 1159F PR MEDICATION LIST DOCUMENTED IN MEDICAL RECORD: ICD-10-PCS | Mod: CPTII,S$GLB,, | Performed by: INTERNAL MEDICINE

## 2023-05-01 PROCEDURE — 3044F HG A1C LEVEL LT 7.0%: CPT | Mod: CPTII,S$GLB,, | Performed by: INTERNAL MEDICINE

## 2023-05-01 PROCEDURE — 3079F DIAST BP 80-89 MM HG: CPT | Mod: CPTII,S$GLB,, | Performed by: INTERNAL MEDICINE

## 2023-05-01 PROCEDURE — 3008F PR BODY MASS INDEX (BMI) DOCUMENTED: ICD-10-PCS | Mod: CPTII,S$GLB,, | Performed by: INTERNAL MEDICINE

## 2023-05-01 PROCEDURE — 3008F BODY MASS INDEX DOCD: CPT | Mod: CPTII,S$GLB,, | Performed by: INTERNAL MEDICINE

## 2023-05-01 NOTE — PROGRESS NOTES
PROGRESS NOTE    Subjective:       Patient ID: Shereen Walker is a 27 y.o. female.    1/9/2023:  MRI Brain:  Findings compatible with dural venous sinus thrombosis as detailed above. There is also involvement of some of the superficial cortical veins  She was on NuvaRing birth control which was removed.    Had Mirena ring placed 2/3/2023-told by OB this was less estrogen  Started on Eliquis    1/11/2023:  CT chest/a/p:  10mm nodular density in central left breast  Small noncalcified lung nodules    1/11/2023:  CT chest:  10 mm nodular density within the central aspect of the left breast. Correlation with targeted ultrasound is recommended.     Small noncalcified pulmonary nodules.    2/27/2023:  1.3 x 0.5 x 1.4 cm lobulated hypoechoic mass is felt to correspond with CT abnormality.  Biopsy of this mass is recommended.  I discussed the findings and recommendation for biopsy with patient at time of diagnostic mammogram.    3/17/2023:  US guided needle biopsy of left breast mass:  Fibroadenoama    3/29/2023:  CT chest  Stable 3mm nodules in 5mm grounglass nodule.   No new nodules.     Chief Complaint:  No chief complaint on file.  Cerbral venous thrombosis follow up    History of Present Illness:   Shereen Walker is a 27 y.o. female who presents for follow up of above.     Ms. Walker is doing well.  No new complaints at this time. No headaches.     Continues on Eliquis 5mg bid as of today, 5/1/2023 and tolerating this well.         1/10/2023:  Hospital Consult HPI:  Ms. Walker is a 28yo female who is admitted with 2 weeks of congestion and progressively worsening head aches that evolved to 3 episodes of right arm numbness was admitted today after CNS imaging showed cerebral venous thrombosis.  Patient has no past medical history of clotting problems nor does she have a FH of such.  She has been on birth control for the last year but no new medications o/w.  She  has been placed on Lovenox and is doing ok at this time with no further neurologic issues.      Family and Social history reviewed and is unchanged from 1/10/2023      ROS:  Review of Systems   Constitutional:  Negative for fever.   Respiratory:  Negative for shortness of breath.    Cardiovascular:  Negative for chest pain and leg swelling.   Gastrointestinal:  Negative for abdominal pain and blood in stool.   Genitourinary:  Negative for hematuria.   Skin:  Negative for rash.        Current Outpatient Medications:     apixaban (ELIQUIS) 5 mg Tab, TAKE 1 TABLET BY MOUTH TWICE DAILY, Disp: 208 tablet, Rfl: 0    brompheniramine-pseudoeph-DM (BROMFED DM) 2-30-10 mg/5 mL Syrp, Take 10 mLs by mouth 4 (four) times daily as needed., Disp: , Rfl:     ferrous sulfate (FEOSOL) 325 mg (65 mg iron) Tab tablet, Take by mouth., Disp: , Rfl:     ipratropium (ATROVENT) 21 mcg (0.03 %) nasal spray, 1 spray by Each Nostril route 2 (two) times daily., Disp: , Rfl:     MIRENA 21 mcg/24 hours (8 yrs) 52 mg IUD, SMARTSIG:Vaginal, Disp: , Rfl:         Objective:       Physical Examination:     /83   Pulse 80   Temp 97.8 °F (36.6 °C)   Wt 84.3 kg (185 lb 14.4 oz)   BMI 32.93 kg/m²     Physical Exam  Constitutional:       Appearance: She is well-developed.   HENT:      Head: Normocephalic and atraumatic.      Right Ear: External ear normal.      Left Ear: External ear normal.   Eyes:      Conjunctiva/sclera: Conjunctivae normal.      Pupils: Pupils are equal, round, and reactive to light.   Neck:      Thyroid: No thyromegaly.      Trachea: No tracheal deviation.   Cardiovascular:      Rate and Rhythm: Normal rate and regular rhythm.      Heart sounds: Normal heart sounds.   Pulmonary:      Effort: Pulmonary effort is normal.      Breath sounds: Normal breath sounds.   Abdominal:      General: Bowel sounds are normal. There is no distension.      Palpations: Abdomen is soft. There is no mass.      Tenderness: There is no abdominal  tenderness.   Skin:     Findings: No rash.   Neurological:      Comments: Neuro intact througout   Psychiatric:         Behavior: Behavior normal.         Thought Content: Thought content normal.         Judgment: Judgment normal.       Labs:   No results found for this or any previous visit (from the past 336 hour(s)).  CMP  Sodium   Date Value Ref Range Status   02/06/2023 137 135 - 146 mmol/L Final     Potassium   Date Value Ref Range Status   02/06/2023 3.6 3.5 - 5.3 mmol/L Final     Chloride   Date Value Ref Range Status   02/06/2023 103 98 - 110 mmol/L Final     CO2   Date Value Ref Range Status   02/06/2023 22 20 - 32 mmol/L Final     Glucose   Date Value Ref Range Status   02/06/2023 111 (H) 65 - 99 mg/dL Final     Comment:                   Fasting reference interval     For someone without known diabetes, a glucose value  between 100 and 125 mg/dL is consistent with  prediabetes and should be confirmed with a  follow-up test.          BUN   Date Value Ref Range Status   02/06/2023 7 7 - 25 mg/dL Final     Creatinine   Date Value Ref Range Status   02/06/2023 0.50 0.50 - 0.96 mg/dL Final     Calcium   Date Value Ref Range Status   02/06/2023 9.2 8.6 - 10.2 mg/dL Final     Total Protein   Date Value Ref Range Status   02/06/2023 6.5 6.1 - 8.1 g/dL Final     Albumin   Date Value Ref Range Status   02/06/2023 4.0 3.6 - 5.1 g/dL Final     Total Bilirubin   Date Value Ref Range Status   02/06/2023 0.8 0.2 - 1.2 mg/dL Final     Alkaline Phosphatase   Date Value Ref Range Status   01/10/2023 53 (L) 55 - 135 U/L Final     AST   Date Value Ref Range Status   02/06/2023 14 10 - 30 U/L Final     ALT   Date Value Ref Range Status   02/06/2023 9 6 - 29 U/L Final     Anion Gap   Date Value Ref Range Status   01/10/2023 5 (L) 8 - 16 mmol/L Final     eGFR if    Date Value Ref Range Status   09/10/2021 >60.0 >60 mL/min/1.73 m^2 Final     eGFR if non    Date Value Ref Range Status   09/10/2021  >60.0 >60 mL/min/1.73 m^2 Final     Comment:     Calculation used to obtain the estimated glomerular filtration  rate (eGFR) is the CKD-EPI equation.        No results found for: CEA  No results found for: PSA        Assessment/Plan:     Problem List Items Addressed This Visit       Lung nodule     This is stable and likely benign.  Will scan again in six months.             Dural venous sinus thrombosis     Patient is doing ok from this standpoint.  Will continue anticoagulants.  She is doing well and want to ensure she continues to do well.  Will see her again in three months.             Relevant Orders    CBC Auto Differential    Comprehensive Metabolic Panel     Discussion:     Follow up in about 3 months (around 8/1/2023).      Electronically signed by Kwasi Bryson

## 2023-05-01 NOTE — ASSESSMENT & PLAN NOTE
Patient is doing ok from this standpoint.  Will continue anticoagulants.  She is doing well and want to ensure she continues to do well.  Will see her again in three months.

## 2023-05-15 DIAGNOSIS — G08 ACUTE CEREBRAL VENOUS SINUS THROMBOSIS: ICD-10-CM

## 2023-07-25 ENCOUNTER — TELEPHONE (OUTPATIENT)
Dept: HEMATOLOGY/ONCOLOGY | Facility: CLINIC | Age: 28
End: 2023-07-25

## 2023-07-25 NOTE — TELEPHONE ENCOUNTER
Called patient for upcoming appointment and laboratory work 08/01/2023, patient requested to reschedule. Message sent to scheduling.

## 2023-08-08 ENCOUNTER — OFFICE VISIT (OUTPATIENT)
Dept: HEMATOLOGY/ONCOLOGY | Facility: CLINIC | Age: 28
End: 2023-08-08
Payer: MEDICAID

## 2023-08-08 ENCOUNTER — LAB VISIT (OUTPATIENT)
Dept: LAB | Facility: HOSPITAL | Age: 28
End: 2023-08-08
Attending: INTERNAL MEDICINE
Payer: MEDICAID

## 2023-08-08 VITALS
WEIGHT: 184.81 LBS | TEMPERATURE: 98 F | BODY MASS INDEX: 32.74 KG/M2 | SYSTOLIC BLOOD PRESSURE: 124 MMHG | DIASTOLIC BLOOD PRESSURE: 55 MMHG | HEART RATE: 82 BPM

## 2023-08-08 DIAGNOSIS — G08 DURAL VENOUS SINUS THROMBOSIS: Primary | ICD-10-CM

## 2023-08-08 DIAGNOSIS — R91.1 LUNG NODULE: ICD-10-CM

## 2023-08-08 DIAGNOSIS — G08 DURAL VENOUS SINUS THROMBOSIS: ICD-10-CM

## 2023-08-08 LAB
ALBUMIN SERPL BCP-MCNC: 4 G/DL (ref 3.5–5.2)
ALP SERPL-CCNC: 52 U/L (ref 55–135)
ALT SERPL W/O P-5'-P-CCNC: 14 U/L (ref 10–44)
ANION GAP SERPL CALC-SCNC: 6 MMOL/L (ref 8–16)
AST SERPL-CCNC: 16 U/L (ref 10–40)
BASOPHILS # BLD AUTO: 0.03 K/UL (ref 0–0.2)
BASOPHILS NFR BLD: 0.4 % (ref 0–1.9)
BILIRUB SERPL-MCNC: 0.8 MG/DL (ref 0.1–1)
BUN SERPL-MCNC: 13 MG/DL (ref 6–20)
CALCIUM SERPL-MCNC: 8.8 MG/DL (ref 8.7–10.5)
CHLORIDE SERPL-SCNC: 108 MMOL/L (ref 95–110)
CO2 SERPL-SCNC: 25 MMOL/L (ref 23–29)
CREAT SERPL-MCNC: 0.6 MG/DL (ref 0.5–1.4)
DIFFERENTIAL METHOD: NORMAL
EOSINOPHIL # BLD AUTO: 0.3 K/UL (ref 0–0.5)
EOSINOPHIL NFR BLD: 3.6 % (ref 0–8)
ERYTHROCYTE [DISTWIDTH] IN BLOOD BY AUTOMATED COUNT: 12.2 % (ref 11.5–14.5)
EST. GFR  (NO RACE VARIABLE): >60 ML/MIN/1.73 M^2
GLUCOSE SERPL-MCNC: 103 MG/DL (ref 70–110)
HCT VFR BLD AUTO: 40.1 % (ref 37–48.5)
HGB BLD-MCNC: 13.9 G/DL (ref 12–16)
IMM GRANULOCYTES # BLD AUTO: 0.01 K/UL (ref 0–0.04)
IMM GRANULOCYTES NFR BLD AUTO: 0.1 % (ref 0–0.5)
LYMPHOCYTES # BLD AUTO: 2.7 K/UL (ref 1–4.8)
LYMPHOCYTES NFR BLD: 36.9 % (ref 18–48)
MCH RBC QN AUTO: 29.7 PG (ref 27–31)
MCHC RBC AUTO-ENTMCNC: 34.7 G/DL (ref 32–36)
MCV RBC AUTO: 86 FL (ref 82–98)
MONOCYTES # BLD AUTO: 0.4 K/UL (ref 0.3–1)
MONOCYTES NFR BLD: 5 % (ref 4–15)
NEUTROPHILS # BLD AUTO: 3.9 K/UL (ref 1.8–7.7)
NEUTROPHILS NFR BLD: 54 % (ref 38–73)
NRBC BLD-RTO: 0 /100 WBC
PLATELET # BLD AUTO: 247 K/UL (ref 150–450)
PMV BLD AUTO: 9.7 FL (ref 9.2–12.9)
POTASSIUM SERPL-SCNC: 3.6 MMOL/L (ref 3.5–5.1)
PROT SERPL-MCNC: 7 G/DL (ref 6–8.4)
RBC # BLD AUTO: 4.68 M/UL (ref 4–5.4)
SODIUM SERPL-SCNC: 139 MMOL/L (ref 136–145)
WBC # BLD AUTO: 7.18 K/UL (ref 3.9–12.7)

## 2023-08-08 PROCEDURE — 3078F PR MOST RECENT DIASTOLIC BLOOD PRESSURE < 80 MM HG: ICD-10-PCS | Mod: CPTII,,, | Performed by: INTERNAL MEDICINE

## 2023-08-08 PROCEDURE — 3074F SYST BP LT 130 MM HG: CPT | Mod: CPTII,,, | Performed by: INTERNAL MEDICINE

## 2023-08-08 PROCEDURE — 3078F DIAST BP <80 MM HG: CPT | Mod: CPTII,,, | Performed by: INTERNAL MEDICINE

## 2023-08-08 PROCEDURE — 3074F PR MOST RECENT SYSTOLIC BLOOD PRESSURE < 130 MM HG: ICD-10-PCS | Mod: CPTII,,, | Performed by: INTERNAL MEDICINE

## 2023-08-08 PROCEDURE — 36415 COLL VENOUS BLD VENIPUNCTURE: CPT | Performed by: INTERNAL MEDICINE

## 2023-08-08 PROCEDURE — 80053 COMPREHEN METABOLIC PANEL: CPT | Performed by: INTERNAL MEDICINE

## 2023-08-08 PROCEDURE — 1159F PR MEDICATION LIST DOCUMENTED IN MEDICAL RECORD: ICD-10-PCS | Mod: CPTII,,, | Performed by: INTERNAL MEDICINE

## 2023-08-08 PROCEDURE — 99213 PR OFFICE/OUTPT VISIT, EST, LEVL III, 20-29 MIN: ICD-10-PCS | Mod: S$PBB,,, | Performed by: INTERNAL MEDICINE

## 2023-08-08 PROCEDURE — 3044F PR MOST RECENT HEMOGLOBIN A1C LEVEL <7.0%: ICD-10-PCS | Mod: CPTII,,, | Performed by: INTERNAL MEDICINE

## 2023-08-08 PROCEDURE — 99212 OFFICE O/P EST SF 10 MIN: CPT | Performed by: INTERNAL MEDICINE

## 2023-08-08 PROCEDURE — 85025 COMPLETE CBC W/AUTO DIFF WBC: CPT | Performed by: INTERNAL MEDICINE

## 2023-08-08 PROCEDURE — 3044F HG A1C LEVEL LT 7.0%: CPT | Mod: CPTII,,, | Performed by: INTERNAL MEDICINE

## 2023-08-08 PROCEDURE — 3008F PR BODY MASS INDEX (BMI) DOCUMENTED: ICD-10-PCS | Mod: CPTII,,, | Performed by: INTERNAL MEDICINE

## 2023-08-08 PROCEDURE — 99213 OFFICE O/P EST LOW 20 MIN: CPT | Mod: S$PBB,,, | Performed by: INTERNAL MEDICINE

## 2023-08-08 PROCEDURE — 3008F BODY MASS INDEX DOCD: CPT | Mod: CPTII,,, | Performed by: INTERNAL MEDICINE

## 2023-08-08 PROCEDURE — 1159F MED LIST DOCD IN RCRD: CPT | Mod: CPTII,,, | Performed by: INTERNAL MEDICINE

## 2023-08-08 NOTE — PROGRESS NOTES
PROGRESS NOTE    Subjective:       Patient ID: Shereen Walker is a 28 y.o. female.    1/9/2023:  MRI Brain:  Findings compatible with dural venous sinus thrombosis as detailed above. There is also involvement of some of the superficial cortical veins  She was on NuvaRing birth control which was removed.    Had Mirena ring placed 2/3/2023-told by OB this was less estrogen  Started on Eliquis    1/11/2023:  CT chest/a/p:  10mm nodular density in central left breast  Small noncalcified lung nodules    1/11/2023:  CT chest:  10 mm nodular density within the central aspect of the left breast. Correlation with targeted ultrasound is recommended.     Small noncalcified pulmonary nodules.    2/27/2023:  1.3 x 0.5 x 1.4 cm lobulated hypoechoic mass is felt to correspond with CT abnormality.  Biopsy of this mass is recommended.  I discussed the findings and recommendation for biopsy with patient at time of diagnostic mammogram.    3/17/2023:  US guided needle biopsy of left breast mass:  Fibroadenoama    3/29/2023:  CT chest  Stable 3mm nodules in 5mm grounglass nodule.   No new nodules.     PLAN:   Full dose Eliquis for 1 year then 2.5mg bid    Chief Complaint:  No chief complaint on file.  Cerbral venous thrombosis follow up    History of Present Illness:   Shereen Walker is a 28 y.o. female who presents for follow up of above.     Ms. Walker is doing well.  No sob, HAs or new complaints o/w.     Continues on Eliquis 5mg bid as of today, 8/8/2023 and tolerating this well.         1/10/2023:  Hospital Consult HPI:  Ms. Walker is a 28yo female who is admitted with 2 weeks of congestion and progressively worsening head aches that evolved to 3 episodes of right arm numbness was admitted today after CNS imaging showed cerebral venous thrombosis.  Patient has no past medical history of clotting problems nor does she have a FH of such.  She has been on birth control for the  last year but no new medications o/w.  She has been placed on Lovenox and is doing ok at this time with no further neurologic issues.      Family and Social history reviewed and is unchanged from 1/10/2023      ROS:  Review of Systems   Constitutional:  Negative for fever.   Respiratory:  Negative for shortness of breath.    Cardiovascular:  Negative for chest pain and leg swelling.   Gastrointestinal:  Negative for abdominal pain and blood in stool.   Genitourinary:  Negative for hematuria.   Skin:  Negative for rash.          Current Outpatient Medications:     apixaban (ELIQUIS) 5 mg Tab, Take 1 tablet (5 mg total) by mouth 2 (two) times daily., Disp: 60 tablet, Rfl: 6    brompheniramine-pseudoeph-DM (BROMFED DM) 2-30-10 mg/5 mL Syrp, Take 10 mLs by mouth 4 (four) times daily as needed., Disp: , Rfl:     ferrous sulfate (FEOSOL) 325 mg (65 mg iron) Tab tablet, Take by mouth., Disp: , Rfl:     ipratropium (ATROVENT) 21 mcg (0.03 %) nasal spray, 1 spray by Each Nostril route 2 (two) times daily., Disp: , Rfl:     MIRENA 21 mcg/24 hours (8 yrs) 52 mg IUD, SMARTSIG:Vaginal, Disp: , Rfl:         Objective:       Physical Examination:     BP (!) 124/55   Pulse 82   Temp 98.2 °F (36.8 °C)   Wt 83.8 kg (184 lb 12.8 oz)   BMI 32.74 kg/m²     Physical Exam  Constitutional:       Appearance: She is well-developed.   HENT:      Head: Normocephalic and atraumatic.      Right Ear: External ear normal.      Left Ear: External ear normal.   Eyes:      Conjunctiva/sclera: Conjunctivae normal.      Pupils: Pupils are equal, round, and reactive to light.   Neck:      Thyroid: No thyromegaly.      Trachea: No tracheal deviation.   Cardiovascular:      Rate and Rhythm: Normal rate and regular rhythm.      Heart sounds: Normal heart sounds.   Pulmonary:      Effort: Pulmonary effort is normal.      Breath sounds: Normal breath sounds.   Abdominal:      General: Bowel sounds are normal. There is no distension.      Palpations:  Abdomen is soft. There is no mass.      Tenderness: There is no abdominal tenderness.   Skin:     Findings: No rash.   Neurological:      Comments: Neuro intact througout   Psychiatric:         Behavior: Behavior normal.         Thought Content: Thought content normal.         Judgment: Judgment normal.         Labs:   Recent Results (from the past 336 hour(s))   CBC Auto Differential    Collection Time: 08/08/23  2:35 PM   Result Value Ref Range    WBC 7.18 3.90 - 12.70 K/uL    Hemoglobin 13.9 12.0 - 16.0 g/dL    Hematocrit 40.1 37.0 - 48.5 %    Platelets 247 150 - 450 K/uL     CMP  Sodium   Date Value Ref Range Status   02/06/2023 137 135 - 146 mmol/L Final     Potassium   Date Value Ref Range Status   02/06/2023 3.6 3.5 - 5.3 mmol/L Final     Chloride   Date Value Ref Range Status   02/06/2023 103 98 - 110 mmol/L Final     CO2   Date Value Ref Range Status   02/06/2023 22 20 - 32 mmol/L Final     Glucose   Date Value Ref Range Status   02/06/2023 111 (H) 65 - 99 mg/dL Final     Comment:                   Fasting reference interval     For someone without known diabetes, a glucose value  between 100 and 125 mg/dL is consistent with  prediabetes and should be confirmed with a  follow-up test.          BUN   Date Value Ref Range Status   02/06/2023 7 7 - 25 mg/dL Final     Creatinine   Date Value Ref Range Status   02/06/2023 0.50 0.50 - 0.96 mg/dL Final     Calcium   Date Value Ref Range Status   02/06/2023 9.2 8.6 - 10.2 mg/dL Final     Total Protein   Date Value Ref Range Status   02/06/2023 6.5 6.1 - 8.1 g/dL Final     Albumin   Date Value Ref Range Status   02/06/2023 4.0 3.6 - 5.1 g/dL Final     Total Bilirubin   Date Value Ref Range Status   02/06/2023 0.8 0.2 - 1.2 mg/dL Final     Alkaline Phosphatase   Date Value Ref Range Status   01/10/2023 53 (L) 55 - 135 U/L Final     AST   Date Value Ref Range Status   02/06/2023 14 10 - 30 U/L Final     ALT   Date Value Ref Range Status   02/06/2023 9 6 - 29 U/L Final  "    Anion Gap   Date Value Ref Range Status   01/10/2023 5 (L) 8 - 16 mmol/L Final     eGFR if    Date Value Ref Range Status   09/10/2021 >60.0 >60 mL/min/1.73 m^2 Final     eGFR if non    Date Value Ref Range Status   09/10/2021 >60.0 >60 mL/min/1.73 m^2 Final     Comment:     Calculation used to obtain the estimated glomerular filtration  rate (eGFR) is the CKD-EPI equation.        No results found for: "CEA"  No results found for: "PSA"        Assessment/Plan:     Problem List Items Addressed This Visit       Lung nodule     Will repeat imaging in 3 months and if these are stable then will likely not need furtjer imaging.  Discussed this today.          Relevant Orders    CT Chest Without Contrast    Dural venous sinus thrombosis - Primary     Patient is doing well and remains on Eliquis 5mg bid.  Will treat at this dose for 1 year then will place on 2.5 bid.  Discussed this today.  Will see her back in four months to adjust.          Relevant Medications    apixaban (ELIQUIS) 5 mg Tab    Other Relevant Orders    CBC Auto Differential    Comprehensive Metabolic Panel     Discussion:     Follow up in about 4 months (around 12/8/2023).      Electronically signed by Kwasi Bryson      "

## 2023-08-08 NOTE — ASSESSMENT & PLAN NOTE
Will repeat imaging in 3 months and if these are stable then will likely not need furtjer imaging.  Discussed this today.

## 2023-08-08 NOTE — ASSESSMENT & PLAN NOTE
Patient is doing well and remains on Eliquis 5mg bid.  Will treat at this dose for 1 year then will place on 2.5 bid.  Discussed this today.  Will see her back in four months to adjust.

## 2023-09-28 ENCOUNTER — PATIENT MESSAGE (OUTPATIENT)
Dept: HEMATOLOGY/ONCOLOGY | Facility: CLINIC | Age: 28
End: 2023-09-28

## 2023-11-29 ENCOUNTER — PATIENT MESSAGE (OUTPATIENT)
Dept: HEMATOLOGY/ONCOLOGY | Facility: CLINIC | Age: 28
End: 2023-11-29

## 2023-12-05 ENCOUNTER — TELEPHONE (OUTPATIENT)
Dept: HEMATOLOGY/ONCOLOGY | Facility: CLINIC | Age: 28
End: 2023-12-05

## 2023-12-05 NOTE — TELEPHONE ENCOUNTER
Called patient for upcoming appointment CT scan and laboratory work 12/12/2023, patient stated understanding.

## 2024-06-25 ENCOUNTER — TELEPHONE (OUTPATIENT)
Facility: CLINIC | Age: 29
End: 2024-06-25
Payer: MEDICAID

## 2024-06-25 DIAGNOSIS — R91.1 LUNG NODULE: Primary | ICD-10-CM

## 2024-06-25 DIAGNOSIS — G08 DURAL VENOUS SINUS THROMBOSIS: ICD-10-CM

## 2024-07-08 ENCOUNTER — PATIENT MESSAGE (OUTPATIENT)
Facility: CLINIC | Age: 29
End: 2024-07-08
Payer: MEDICAID

## 2024-07-09 ENCOUNTER — TELEPHONE (OUTPATIENT)
Facility: CLINIC | Age: 29
End: 2024-07-09
Payer: MEDICAID

## 2024-07-09 ENCOUNTER — HOSPITAL ENCOUNTER (OUTPATIENT)
Dept: RADIOLOGY | Facility: HOSPITAL | Age: 29
Discharge: HOME | End: 2024-07-09
Attending: NURSE PRACTITIONER
Payer: MEDICAID

## 2024-07-09 DIAGNOSIS — G08 DURAL VENOUS SINUS THROMBOSIS: ICD-10-CM

## 2024-07-09 DIAGNOSIS — R91.1 LUNG NODULE: ICD-10-CM

## 2024-07-09 PROCEDURE — 71250 CT THORAX DX C-: CPT | Mod: TC,PO

## 2024-07-09 PROCEDURE — 71250 CT THORAX DX C-: CPT | Mod: 26,,, | Performed by: RADIOLOGY

## 2024-07-09 NOTE — TELEPHONE ENCOUNTER
Called patient regarding CT chest, Per Arlyn Kruger NP, lung nodule is stable. Continue follow up as scheduled, next appointment with Dr. Cardenas on 08/13/2024, patient stated understanding.

## 2024-07-20 ENCOUNTER — PATIENT MESSAGE (OUTPATIENT)
Facility: CLINIC | Age: 29
End: 2024-07-20
Payer: MEDICAID

## 2024-08-09 ENCOUNTER — TELEPHONE (OUTPATIENT)
Facility: CLINIC | Age: 29
End: 2024-08-09
Payer: MEDICAID